# Patient Record
Sex: FEMALE | Race: WHITE | Employment: FULL TIME | URBAN - METROPOLITAN AREA
[De-identification: names, ages, dates, MRNs, and addresses within clinical notes are randomized per-mention and may not be internally consistent; named-entity substitution may affect disease eponyms.]

---

## 2017-07-25 ENCOUNTER — OFFICE VISIT (OUTPATIENT)
Dept: FAMILY MEDICINE CLINIC | Facility: CLINIC | Age: 42
End: 2017-07-25

## 2017-07-25 VITALS
BODY MASS INDEX: 21.83 KG/M2 | SYSTOLIC BLOOD PRESSURE: 100 MMHG | DIASTOLIC BLOOD PRESSURE: 63 MMHG | HEART RATE: 61 BPM | HEIGHT: 65 IN | WEIGHT: 131 LBS | TEMPERATURE: 98 F

## 2017-07-25 DIAGNOSIS — Z00.00 PHYSICAL EXAM, ROUTINE: Primary | ICD-10-CM

## 2017-07-25 PROCEDURE — 99396 PREV VISIT EST AGE 40-64: CPT | Performed by: FAMILY MEDICINE

## 2017-07-25 RX ORDER — SERTRALINE HYDROCHLORIDE 25 MG/1
25 TABLET, FILM COATED ORAL DAILY
COMMUNITY
End: 2017-07-25

## 2017-07-25 RX ORDER — SERTRALINE HYDROCHLORIDE 25 MG/1
25 TABLET, FILM COATED ORAL DAILY
Qty: 90 TABLET | Refills: 3 | Status: SHIPPED | OUTPATIENT
Start: 2017-07-25 | End: 2021-05-18

## 2017-07-26 NOTE — PROGRESS NOTES
HPI:    Patient ID: Gilberto Conner is a 39year old female.     Patient here to establish care,   Feels well has anxiety /depression controlled with zoloft        Review of Systems   Constitutional: Negative for activity change, appetite change, chills, di ASSESSMENT/PLAN:   Physical exam, routine  (primary encounter diagnosis)  Blood test to follow   Anxiety controlled cpm  Heartburn continue ppis prn    Orders Placed This Encounter      Lipid Panel [E]      Comp Metabolic Panel (14) [E]      CBC W Differ

## 2017-08-07 ENCOUNTER — HOSPITAL ENCOUNTER (OUTPATIENT)
Dept: MAMMOGRAPHY | Age: 42
Discharge: HOME | End: 2017-08-07
Attending: FAMILY MEDICINE
Payer: COMMERCIAL

## 2017-08-07 DIAGNOSIS — Z12.31 ENCOUNTER FOR SCREENING MAMMOGRAM FOR MALIGNANT NEOPLASM OF BREAST: ICD-10-CM

## 2017-08-07 DIAGNOSIS — Z00.00 PHYSICAL EXAM, ROUTINE: ICD-10-CM

## 2017-08-07 PROCEDURE — 77067 SCR MAMMO BI INCL CAD: CPT | Performed by: FAMILY MEDICINE

## 2017-09-29 ENCOUNTER — LAB ENCOUNTER (OUTPATIENT)
Dept: LAB | Age: 42
End: 2017-09-29
Attending: FAMILY MEDICINE
Payer: COMMERCIAL

## 2017-09-29 DIAGNOSIS — Z00.00 PHYSICAL EXAM, ROUTINE: ICD-10-CM

## 2017-09-29 PROCEDURE — 80053 COMPREHEN METABOLIC PANEL: CPT

## 2017-09-29 PROCEDURE — 85025 COMPLETE CBC W/AUTO DIFF WBC: CPT

## 2017-09-29 PROCEDURE — 36415 COLL VENOUS BLD VENIPUNCTURE: CPT

## 2017-09-29 PROCEDURE — 80061 LIPID PANEL: CPT

## 2017-10-01 ENCOUNTER — TELEPHONE (OUTPATIENT)
Dept: FAMILY MEDICINE CLINIC | Facility: CLINIC | Age: 42
End: 2017-10-01

## 2017-10-01 DIAGNOSIS — R79.9 ABNORMAL BLOOD FINDINGS: ICD-10-CM

## 2017-10-01 DIAGNOSIS — R00.2 PALPITATION: Primary | ICD-10-CM

## 2017-10-02 ENCOUNTER — LAB ENCOUNTER (OUTPATIENT)
Dept: LAB | Facility: HOSPITAL | Age: 42
End: 2017-10-02
Attending: FAMILY MEDICINE
Payer: COMMERCIAL

## 2017-10-02 DIAGNOSIS — R00.2 PALPITATION: Primary | ICD-10-CM

## 2017-10-02 PROCEDURE — 93010 ELECTROCARDIOGRAM REPORT: CPT | Performed by: FAMILY MEDICINE

## 2017-10-02 PROCEDURE — 93005 ELECTROCARDIOGRAM TRACING: CPT

## 2017-10-03 ENCOUNTER — APPOINTMENT (OUTPATIENT)
Dept: LAB | Age: 42
End: 2017-10-03
Attending: FAMILY MEDICINE
Payer: COMMERCIAL

## 2017-10-03 DIAGNOSIS — R79.9 ABNORMAL BLOOD FINDINGS: ICD-10-CM

## 2017-10-03 PROCEDURE — 82607 VITAMIN B-12: CPT

## 2017-10-03 PROCEDURE — 36415 COLL VENOUS BLD VENIPUNCTURE: CPT

## 2017-10-03 PROCEDURE — 80069 RENAL FUNCTION PANEL: CPT

## 2017-10-06 ENCOUNTER — IMMUNIZATION (OUTPATIENT)
Dept: PEDIATRICS CLINIC | Facility: CLINIC | Age: 42
End: 2017-10-06

## 2017-10-06 DIAGNOSIS — Z23 NEED FOR VACCINATION: ICD-10-CM

## 2017-10-06 PROCEDURE — 90686 IIV4 VACC NO PRSV 0.5 ML IM: CPT | Performed by: NURSE PRACTITIONER

## 2017-10-06 PROCEDURE — 90471 IMMUNIZATION ADMIN: CPT | Performed by: NURSE PRACTITIONER

## 2017-10-21 NOTE — PROGRESS NOTES
HPI:    Patient ID: Azul Marrero is a 39year old female. Patient was not feeling well few days ago now better,   Feels occasional palpitations.    Also few days ago had some nausea and diarrhea and blood test showed some decreased GFR, but now its f/u   Also if continues with palpitations to see cardiologist  No orders of the defined types were placed in this encounter.       Meds This Visit:  No prescriptions requested or ordered in this encounter       Imaging & Referrals:  None       [facility]#[id number]

## 2018-09-20 ENCOUNTER — OFFICE VISIT (OUTPATIENT)
Dept: FAMILY MEDICINE CLINIC | Facility: CLINIC | Age: 43
End: 2018-09-20
Payer: COMMERCIAL

## 2018-09-20 VITALS — TEMPERATURE: 99 F | SYSTOLIC BLOOD PRESSURE: 107 MMHG | HEART RATE: 61 BPM | DIASTOLIC BLOOD PRESSURE: 67 MMHG

## 2018-09-20 DIAGNOSIS — M53.3 CHRONIC RIGHT SI JOINT PAIN: Primary | ICD-10-CM

## 2018-09-20 DIAGNOSIS — G89.29 CHRONIC RIGHT SI JOINT PAIN: Primary | ICD-10-CM

## 2018-09-20 LAB
CONTROL LINE PRESENT WITH A CLEAR BACKGROUND (YES/NO): YES YES/NO
KIT LOT #: NORMAL NUMERIC

## 2018-09-20 PROCEDURE — 98927 OSTEOPATH MANJ 5-6 REGIONS: CPT | Performed by: FAMILY MEDICINE

## 2018-09-20 PROCEDURE — 81025 URINE PREGNANCY TEST: CPT | Performed by: FAMILY MEDICINE

## 2018-09-20 NOTE — PROGRESS NOTES
Chronic si joint pain / hip pain on rt side  Goes into rt hip and upper thigh laterally. Exercise improves temporarily. Exam  Ant anterior sacrum  Hip good rom nog pain with abduction /abduction  No knee pain.       A/p  (M53.3,  G89.29) Chronic right

## 2018-12-04 ENCOUNTER — IMMUNIZATION (OUTPATIENT)
Dept: PEDIATRICS CLINIC | Facility: CLINIC | Age: 43
End: 2018-12-04
Payer: COMMERCIAL

## 2018-12-04 DIAGNOSIS — Z23 NEED FOR VACCINATION: ICD-10-CM

## 2018-12-04 PROCEDURE — 90471 IMMUNIZATION ADMIN: CPT | Performed by: PEDIATRICS

## 2018-12-04 PROCEDURE — 90686 IIV4 VACC NO PRSV 0.5 ML IM: CPT | Performed by: PEDIATRICS

## 2019-05-04 NOTE — LETTER
[facility]   Date:   2/15/2024     Name:   Eliana Jean    YOB: 1975   MRN:   [id number]       WHERE IS YOUR PAIN NOW?  Ricci the areas on your body where you feel the described sensations.  Use the appropriate symbol.  Ricci the areas of radiation.  Include all affected areas.  Just to complete the picture, please draw in the face.     ACHE:  ^ ^ ^   NUMBNESS:  0000   PINS & NEEDLES:  = = = =                              ^ ^ ^                       0000              = = = =                                    ^ ^ ^                       0000            = = = =      BURNING:  XXXX   STABBING: ////                  XXXX                ////                         XXXX          ////     Please ricci the line below indicating your degree of pain right now  with 0 being no pain 10 being the worst pain possible.                                         0             1             2              3             4              5              6              7             8             9             10         Patient Signature:             No

## 2019-12-05 ENCOUNTER — OFFICE VISIT (OUTPATIENT)
Dept: INTERNAL MEDICINE CLINIC | Facility: CLINIC | Age: 44
End: 2019-12-05
Payer: COMMERCIAL

## 2019-12-05 DIAGNOSIS — R53.83 FATIGUE, UNSPECIFIED TYPE: ICD-10-CM

## 2019-12-05 DIAGNOSIS — Z00.00 ROUTINE HEALTH MAINTENANCE: ICD-10-CM

## 2019-12-05 DIAGNOSIS — R07.89 CHEST PRESSURE: Primary | ICD-10-CM

## 2019-12-05 NOTE — PROGRESS NOTES
HPI:    Patient ID: Karl Campbell is a 37year old female. Patient has a h/o of PVCs,  and intermittent substernal chest pressure and shortness of breath.   She has had symptoms like this in the past but they are recently worse and they are occurring normal.   Cardiovascular: Normal rate, regular rhythm and normal heart sounds. Pulmonary/Chest: Effort normal and breath sounds normal. No respiratory distress. Neurological: She is alert and oriented to person, place, and time.               ASSESSMEN

## 2019-12-06 NOTE — ASSESSMENT & PLAN NOTE
Patient's EKG shows NSR and is essentially normal.  I compared to previous EKG on 10/2/2017 and there is no change. There are no PVCs today. I recommend that she have a treadmill stress test and a chest x-ray.   She should follow-up with myself or her PCP

## 2019-12-06 NOTE — ASSESSMENT & PLAN NOTE
The patient is overdue for her routine mammogram and Pap smear. I encouraged her to schedule these things at her convenience.

## 2019-12-12 ENCOUNTER — LAB ENCOUNTER (OUTPATIENT)
Dept: LAB | Age: 44
End: 2019-12-12
Attending: INTERNAL MEDICINE
Payer: COMMERCIAL

## 2019-12-12 ENCOUNTER — IMMUNIZATION (OUTPATIENT)
Dept: PEDIATRICS CLINIC | Facility: CLINIC | Age: 44
End: 2019-12-12
Payer: COMMERCIAL

## 2019-12-12 DIAGNOSIS — Z23 NEED FOR VACCINATION: ICD-10-CM

## 2019-12-12 DIAGNOSIS — Z00.00 ROUTINE HEALTH MAINTENANCE: ICD-10-CM

## 2019-12-12 PROCEDURE — 84443 ASSAY THYROID STIM HORMONE: CPT

## 2019-12-12 PROCEDURE — 36415 COLL VENOUS BLD VENIPUNCTURE: CPT

## 2019-12-12 PROCEDURE — 85025 COMPLETE CBC W/AUTO DIFF WBC: CPT

## 2019-12-12 PROCEDURE — 90471 IMMUNIZATION ADMIN: CPT | Performed by: PEDIATRICS

## 2019-12-12 PROCEDURE — 80053 COMPREHEN METABOLIC PANEL: CPT

## 2019-12-12 PROCEDURE — 90686 IIV4 VACC NO PRSV 0.5 ML IM: CPT | Performed by: PEDIATRICS

## 2019-12-12 PROCEDURE — 82607 VITAMIN B-12: CPT

## 2020-06-29 DIAGNOSIS — Z78.9 PARTICIPANT IN HEALTH AND WELLNESS PLAN: Primary | ICD-10-CM

## 2020-10-22 ENCOUNTER — TELEPHONE (OUTPATIENT)
Dept: INTERNAL MEDICINE CLINIC | Facility: HOSPITAL | Age: 45
End: 2020-10-22

## 2020-10-22 ENCOUNTER — LAB ENCOUNTER (OUTPATIENT)
Dept: LAB | Age: 45
End: 2020-10-22
Attending: PREVENTIVE MEDICINE
Payer: COMMERCIAL

## 2020-10-22 DIAGNOSIS — Z20.822 SUSPECTED 2019 NOVEL CORONAVIRUS INFECTION: Primary | ICD-10-CM

## 2020-10-22 DIAGNOSIS — Z20.822 SUSPECTED 2019 NOVEL CORONAVIRUS INFECTION: ICD-10-CM

## 2020-12-17 ENCOUNTER — IMMUNIZATION (OUTPATIENT)
Dept: LAB | Facility: HOSPITAL | Age: 45
End: 2020-12-17
Attending: PREVENTIVE MEDICINE
Payer: COMMERCIAL

## 2020-12-17 DIAGNOSIS — Z23 NEED FOR VACCINATION: ICD-10-CM

## 2020-12-17 PROCEDURE — 0001A PFIZER-BIONTECH COVID-19 VACCINE: CPT

## 2021-01-07 ENCOUNTER — IMMUNIZATION (OUTPATIENT)
Dept: LAB | Facility: HOSPITAL | Age: 46
End: 2021-01-07
Attending: PREVENTIVE MEDICINE
Payer: COMMERCIAL

## 2021-01-07 DIAGNOSIS — Z23 NEED FOR VACCINATION: ICD-10-CM

## 2021-01-07 PROCEDURE — 0002A SARSCOV2 VAC 30MCG/0.3ML IM: CPT

## 2021-04-29 ENCOUNTER — HOSPITAL ENCOUNTER (OUTPATIENT)
Dept: MAMMOGRAPHY | Age: 46
Discharge: HOME | End: 2021-04-29
Attending: OBSTETRICS & GYNECOLOGY
Payer: COMMERCIAL

## 2021-04-29 DIAGNOSIS — Z12.31 ENCOUNTER FOR SCREENING MAMMOGRAM FOR MALIGNANT NEOPLASM OF BREAST: ICD-10-CM

## 2021-04-29 PROCEDURE — 77067 SCR MAMMO BI INCL CAD: CPT | Performed by: OBSTETRICS & GYNECOLOGY

## 2021-04-29 PROCEDURE — 77063 BREAST TOMOSYNTHESIS BI: CPT | Performed by: OBSTETRICS & GYNECOLOGY

## 2021-05-07 ENCOUNTER — TELEPHONE (OUTPATIENT)
Dept: OPHTHALMOLOGY | Facility: CLINIC | Age: 46
End: 2021-05-07

## 2021-05-07 NOTE — TELEPHONE ENCOUNTER
Patient calling for herself. She noticed a brown, stringy floater in her left eye suddenly yesterday while she was exercising. She denies any flashes of light, decreased vision or curtain/ veil effect.   She says the brown thing in her left eye looks like

## 2021-05-08 ENCOUNTER — OFFICE VISIT (OUTPATIENT)
Dept: OPHTHALMOLOGY | Facility: CLINIC | Age: 46
End: 2021-05-08
Payer: COMMERCIAL

## 2021-05-08 DIAGNOSIS — H47.392 OPTIC DISC HEMORRHAGE, LEFT: Primary | ICD-10-CM

## 2021-05-08 NOTE — ASSESSMENT & PLAN NOTE
Discussed diagnosis with patient. There is no retinal tear, but a hemorrhage on the left optic nerve. Discussed that it most likely was caused by exercise such as lifting weights. Discussed that most likely this will reabsorb in 1 month.    Patient was

## 2021-05-08 NOTE — PROGRESS NOTES
Chava Parson is a 39year old female. HPI:     HPI     New patient here for an urgent visit. She noticed a brown, stringy floater and black dots in her left eye suddenly on 5/6/21 while she was exercising.   She denies any flashes of light, decreas 8:05 AM)       Right Left    Pressure 18 19          Pupils       Pupils    Right PERRL    Left PERRL          Dilation     Left eye: 1.0% Mydriacyl @ 8:03 AM            Additional Tests     Amsler       Right Left     Normal Normal            Slit Lamp an by: Gideon Vang MD

## 2021-05-08 NOTE — PATIENT INSTRUCTIONS
Optic disc hemorrhage, left  Discussed diagnosis with patient. There is no retinal tear, but a hemorrhage on the left optic nerve. Discussed that it most likely was caused by exercise such as lifting weights.   Discussed that most likely this will reabs

## 2021-05-12 ENCOUNTER — MOBILE ENCOUNTER (OUTPATIENT)
Dept: FAMILY MEDICINE CLINIC | Facility: CLINIC | Age: 46
End: 2021-05-12

## 2021-05-12 DIAGNOSIS — R51.9 SUDDEN ONSET OF SEVERE HEADACHE: Primary | ICD-10-CM

## 2021-05-12 DIAGNOSIS — H47.029: ICD-10-CM

## 2021-05-13 ENCOUNTER — HOSPITAL ENCOUNTER (OUTPATIENT)
Dept: MRI IMAGING | Facility: HOSPITAL | Age: 46
Discharge: HOME | End: 2021-05-13
Attending: FAMILY MEDICINE
Payer: COMMERCIAL

## 2021-05-13 DIAGNOSIS — H47.029: ICD-10-CM

## 2021-05-13 DIAGNOSIS — R51.9 SUDDEN ONSET OF SEVERE HEADACHE: ICD-10-CM

## 2021-05-13 PROCEDURE — 70543 MRI ORBT/FAC/NCK W/O &W/DYE: CPT | Performed by: FAMILY MEDICINE

## 2021-05-13 PROCEDURE — A9575 INJ GADOTERATE MEGLUMI 0.1ML: HCPCS | Performed by: FAMILY MEDICINE

## 2021-05-13 PROCEDURE — 70553 MRI BRAIN STEM W/O & W/DYE: CPT | Performed by: FAMILY MEDICINE

## 2021-05-18 ENCOUNTER — OFFICE VISIT (OUTPATIENT)
Dept: FAMILY MEDICINE CLINIC | Facility: CLINIC | Age: 46
End: 2021-05-18
Payer: COMMERCIAL

## 2021-05-18 VITALS
BODY MASS INDEX: 23.05 KG/M2 | DIASTOLIC BLOOD PRESSURE: 67 MMHG | HEART RATE: 78 BPM | SYSTOLIC BLOOD PRESSURE: 105 MMHG | HEIGHT: 64 IN | WEIGHT: 135 LBS

## 2021-05-18 DIAGNOSIS — R51.9 NONINTRACTABLE EPISODIC HEADACHE, UNSPECIFIED HEADACHE TYPE: Primary | ICD-10-CM

## 2021-05-18 PROCEDURE — 97810 ACUP 1/> WO ESTIM 1ST 15 MIN: CPT | Performed by: FAMILY MEDICINE

## 2021-05-18 PROCEDURE — 3074F SYST BP LT 130 MM HG: CPT | Performed by: FAMILY MEDICINE

## 2021-05-18 PROCEDURE — 3008F BODY MASS INDEX DOCD: CPT | Performed by: FAMILY MEDICINE

## 2021-05-18 PROCEDURE — 99213 OFFICE O/P EST LOW 20 MIN: CPT | Performed by: FAMILY MEDICINE

## 2021-05-18 PROCEDURE — 3078F DIAST BP <80 MM HG: CPT | Performed by: FAMILY MEDICINE

## 2021-05-18 PROCEDURE — 97811 ACUP 1/> W/O ESTIM EA ADD 15: CPT | Performed by: FAMILY MEDICINE

## 2021-05-26 NOTE — PROCEDURES
Patient tolerated procedure well in excess of 30 minutes was spent with patient   There was no complications all needles were removed.    Patient was advised to rest today and f/u in 1-2 weeks  Liv3, raymundo treatment, boone points, ear points

## 2021-05-26 NOTE — PROGRESS NOTES
HPI/Subjective:   Patient ID: Priyanka Herr is a 39year old female. Patient here for f/u. Patient had carri change in vision and was dg with optic nerve hemorrhage . Had MRI brain and orbits normal. Continues with the pain in the eye .  It comes and g Prescriptions      No prescriptions requested or ordered in this encounter       Imaging & Referrals:  None

## 2021-06-08 ENCOUNTER — TELEPHONE (OUTPATIENT)
Dept: OPHTHALMOLOGY | Facility: CLINIC | Age: 46
End: 2021-06-08

## 2021-06-08 NOTE — TELEPHONE ENCOUNTER
Spoke with patient to find out why she cancelled her appointment with Dr. Ashly Harris. Patient says ended up seeing a retinal specialist- Dr. Escalante. He wanted pt to get an MRI of the brain and orbits right away which she had done.   The MRIs were normal.

## 2021-06-08 NOTE — TELEPHONE ENCOUNTER
Dr. Rojas Repress office states hasn't closed notes yet so they can't send but did send report but once he closes notes they will send please advise

## 2021-07-27 ENCOUNTER — HOSPITAL ENCOUNTER (OUTPATIENT)
Dept: MRI IMAGING | Age: 46
Discharge: HOME | End: 2021-07-27
Attending: OPHTHALMOLOGY
Payer: COMMERCIAL

## 2021-07-27 DIAGNOSIS — H47.022: ICD-10-CM

## 2021-07-27 DIAGNOSIS — H47.10 EDEMA OF OPTIC NERVE: ICD-10-CM

## 2021-07-27 DIAGNOSIS — R20.0 LEFT FACIAL NUMBNESS: ICD-10-CM

## 2021-07-27 DIAGNOSIS — G50.0 TRIGEMINAL NEURALGIA: ICD-10-CM

## 2021-07-27 PROCEDURE — 70544 MR ANGIOGRAPHY HEAD W/O DYE: CPT | Performed by: OPHTHALMOLOGY

## 2021-11-30 ENCOUNTER — IMMUNIZATION (OUTPATIENT)
Dept: LAB | Facility: HOSPITAL | Age: 46
End: 2021-11-30
Attending: EMERGENCY MEDICINE
Payer: COMMERCIAL

## 2021-11-30 DIAGNOSIS — Z23 NEED FOR VACCINATION: Primary | ICD-10-CM

## 2021-11-30 PROCEDURE — 0004A SARSCOV2 VAC 30MCG/0.3ML IM: CPT

## 2021-12-27 ENCOUNTER — HOSPITAL ENCOUNTER (OUTPATIENT)
Age: 46
Discharge: HOME | End: 2021-12-27
Payer: COMMERCIAL

## 2021-12-27 VITALS
DIASTOLIC BLOOD PRESSURE: 70 MMHG | RESPIRATION RATE: 18 BRPM | TEMPERATURE: 98 F | OXYGEN SATURATION: 99 % | HEART RATE: 62 BPM | SYSTOLIC BLOOD PRESSURE: 109 MMHG

## 2021-12-27 DIAGNOSIS — Z20.822 EXPOSURE TO COVID-19 VIRUS: ICD-10-CM

## 2021-12-27 DIAGNOSIS — Z20.822 ENCOUNTER FOR LABORATORY TESTING FOR COVID-19 VIRUS: Primary | ICD-10-CM

## 2021-12-27 LAB — SARS-COV-2 RNA RESP QL NAA+PROBE: NOT DETECTED

## 2021-12-27 PROCEDURE — 99213 OFFICE O/P EST LOW 20 MIN: CPT

## 2021-12-27 PROCEDURE — 99212 OFFICE O/P EST SF 10 MIN: CPT

## 2021-12-27 PROCEDURE — 99203 OFFICE O/P NEW LOW 30 MIN: CPT

## 2021-12-28 NOTE — ED PROVIDER NOTES
Patient presents with:  Covid-19 Test      HPI:     Tracey Wells is a 55year old female who presents for fatigue, mild headache, and nausea that started a few days ago. She has had multiple exposures to Covid and would like testing.   She is fully va Positive for stated complaint: chills, nausea, fatigue, headache  All other systems reviewed and negative except as noted above. Constitutional and Vital Signs Reviewed.     Physical Exam:     Findings:    /70   Pulse 62   Temp 97.8 °F (36.6 °C) (T

## 2022-03-04 ENCOUNTER — HOSPITAL ENCOUNTER (OUTPATIENT)
Dept: MAMMOGRAPHY | Facility: HOSPITAL | Age: 47
Discharge: HOME | End: 2022-03-04
Attending: OBSTETRICS & GYNECOLOGY
Payer: COMMERCIAL

## 2022-03-04 ENCOUNTER — HOSPITAL ENCOUNTER (OUTPATIENT)
Dept: ULTRASOUND IMAGING | Facility: HOSPITAL | Age: 47
Discharge: HOME | End: 2022-03-04
Attending: OBSTETRICS & GYNECOLOGY
Payer: COMMERCIAL

## 2022-03-04 DIAGNOSIS — N64.4 SORE NIPPLE: ICD-10-CM

## 2022-03-04 PROCEDURE — 77062 BREAST TOMOSYNTHESIS BI: CPT | Performed by: OBSTETRICS & GYNECOLOGY

## 2022-03-04 PROCEDURE — 76642 ULTRASOUND BREAST LIMITED: CPT | Performed by: OBSTETRICS & GYNECOLOGY

## 2022-03-04 PROCEDURE — 77066 DX MAMMO INCL CAD BI: CPT | Performed by: OBSTETRICS & GYNECOLOGY

## 2022-03-11 ENCOUNTER — LAB ENCOUNTER (OUTPATIENT)
Dept: LAB | Age: 47
End: 2022-03-11
Attending: STUDENT IN AN ORGANIZED HEALTH CARE EDUCATION/TRAINING PROGRAM
Payer: COMMERCIAL

## 2022-03-11 ENCOUNTER — TELEPHONE (OUTPATIENT)
Dept: FAMILY MEDICINE CLINIC | Facility: CLINIC | Age: 47
End: 2022-03-11

## 2022-03-11 ENCOUNTER — MED REC SCAN ONLY (OUTPATIENT)
Dept: FAMILY MEDICINE CLINIC | Facility: CLINIC | Age: 47
End: 2022-03-11

## 2022-03-11 ENCOUNTER — OFFICE VISIT (OUTPATIENT)
Dept: FAMILY MEDICINE CLINIC | Facility: CLINIC | Age: 47
End: 2022-03-11
Payer: COMMERCIAL

## 2022-03-11 VITALS
SYSTOLIC BLOOD PRESSURE: 95 MMHG | HEART RATE: 64 BPM | WEIGHT: 138 LBS | DIASTOLIC BLOOD PRESSURE: 59 MMHG | BODY MASS INDEX: 23.56 KG/M2 | HEIGHT: 64 IN

## 2022-03-11 DIAGNOSIS — Z12.11 COLON CANCER SCREENING: ICD-10-CM

## 2022-03-11 DIAGNOSIS — H47.029: ICD-10-CM

## 2022-03-11 DIAGNOSIS — G50.1 ATYPICAL FACIAL PAIN: Primary | ICD-10-CM

## 2022-03-11 DIAGNOSIS — Z00.00 WELL ADULT EXAM: ICD-10-CM

## 2022-03-11 LAB
ALBUMIN SERPL-MCNC: 3.3 G/DL (ref 3.4–5)
ALBUMIN/GLOB SERPL: 1.2 {RATIO} (ref 1–2)
ALP LIVER SERPL-CCNC: 49 U/L
ALT SERPL-CCNC: 27 U/L
ANION GAP SERPL CALC-SCNC: 4 MMOL/L (ref 0–18)
AST SERPL-CCNC: 34 U/L (ref 15–37)
BILIRUB SERPL-MCNC: 0.5 MG/DL (ref 0.1–2)
BUN BLD-MCNC: 15 MG/DL (ref 7–18)
BUN/CREAT SERPL: 15.5 (ref 10–20)
CALCIUM BLD-MCNC: 8.5 MG/DL (ref 8.5–10.1)
CHLORIDE SERPL-SCNC: 107 MMOL/L (ref 98–112)
CHOLEST SERPL-MCNC: 225 MG/DL (ref ?–200)
CO2 SERPL-SCNC: 29 MMOL/L (ref 21–32)
CREAT BLD-MCNC: 0.97 MG/DL
DEPRECATED RDW RBC AUTO: 48 FL (ref 35.1–46.3)
ERYTHROCYTE [DISTWIDTH] IN BLOOD BY AUTOMATED COUNT: 12.6 % (ref 11–15)
EST. AVERAGE GLUCOSE BLD GHB EST-MCNC: 100 MG/DL (ref 68–126)
FASTING PATIENT LIPID ANSWER: YES
FASTING STATUS PATIENT QL REPORTED: YES
GLOBULIN PLAS-MCNC: 2.7 G/DL (ref 2.8–4.4)
GLUCOSE BLD-MCNC: 86 MG/DL (ref 70–99)
HBA1C MFR BLD: 5.1 % (ref ?–5.7)
HCT VFR BLD AUTO: 42.4 %
HDLC SERPL-MCNC: 86 MG/DL (ref 40–59)
HGB BLD-MCNC: 13.8 G/DL
LDLC SERPL CALC-MCNC: 129 MG/DL (ref ?–100)
MCH RBC QN AUTO: 33.6 PG (ref 26–34)
MCHC RBC AUTO-ENTMCNC: 32.5 G/DL (ref 31–37)
MCV RBC AUTO: 103.2 FL
NONHDLC SERPL-MCNC: 139 MG/DL (ref ?–130)
OSMOLALITY SERPL CALC.SUM OF ELEC: 290 MOSM/KG (ref 275–295)
PLATELET # BLD AUTO: 134 10(3)UL (ref 150–450)
POTASSIUM SERPL-SCNC: 4.2 MMOL/L (ref 3.5–5.1)
PROT SERPL-MCNC: 6 G/DL (ref 6.4–8.2)
RBC # BLD AUTO: 4.11 X10(6)UL
SODIUM SERPL-SCNC: 140 MMOL/L (ref 136–145)
TRIGL SERPL-MCNC: 59 MG/DL (ref 30–149)
TSI SER-ACNC: 1.32 MIU/ML (ref 0.36–3.74)
VIT D+METAB SERPL-MCNC: 12.3 NG/ML (ref 30–100)
VLDLC SERPL CALC-MCNC: 11 MG/DL (ref 0–30)
WBC # BLD AUTO: 5.4 X10(3) UL (ref 4–11)

## 2022-03-11 PROCEDURE — 82306 VITAMIN D 25 HYDROXY: CPT

## 2022-03-11 PROCEDURE — 36415 COLL VENOUS BLD VENIPUNCTURE: CPT

## 2022-03-11 PROCEDURE — 80061 LIPID PANEL: CPT

## 2022-03-11 PROCEDURE — 80053 COMPREHEN METABOLIC PANEL: CPT

## 2022-03-11 PROCEDURE — 99214 OFFICE O/P EST MOD 30 MIN: CPT | Performed by: STUDENT IN AN ORGANIZED HEALTH CARE EDUCATION/TRAINING PROGRAM

## 2022-03-11 PROCEDURE — 3074F SYST BP LT 130 MM HG: CPT | Performed by: STUDENT IN AN ORGANIZED HEALTH CARE EDUCATION/TRAINING PROGRAM

## 2022-03-11 PROCEDURE — 3078F DIAST BP <80 MM HG: CPT | Performed by: STUDENT IN AN ORGANIZED HEALTH CARE EDUCATION/TRAINING PROGRAM

## 2022-03-11 PROCEDURE — 3008F BODY MASS INDEX DOCD: CPT | Performed by: STUDENT IN AN ORGANIZED HEALTH CARE EDUCATION/TRAINING PROGRAM

## 2022-03-11 PROCEDURE — 84443 ASSAY THYROID STIM HORMONE: CPT

## 2022-03-11 PROCEDURE — 85027 COMPLETE CBC AUTOMATED: CPT

## 2022-03-11 PROCEDURE — 83036 HEMOGLOBIN GLYCOSYLATED A1C: CPT

## 2022-03-11 RX ORDER — CLONAZEPAM 0.5 MG/1
TABLET ORAL
COMMUNITY

## 2022-03-11 RX ORDER — PYRIDOXINE HCL (VITAMIN B6) 50 MG
TABLET ORAL
COMMUNITY

## 2022-03-11 RX ORDER — DROSPIRENONE 4 MG/1
1 TABLET, FILM COATED ORAL DAILY
COMMUNITY
Start: 2022-02-08

## 2022-03-11 RX ORDER — INDOMETHACIN 50 MG/1
50 CAPSULE ORAL
Qty: 30 CAPSULE | Refills: 0 | Status: SHIPPED | OUTPATIENT
Start: 2022-03-11

## 2022-03-11 RX ORDER — SERTRALINE HYDROCHLORIDE 100 MG/1
100 TABLET, FILM COATED ORAL DAILY
COMMUNITY
Start: 2022-03-02

## 2022-03-14 ENCOUNTER — MED REC SCAN ONLY (OUTPATIENT)
Dept: FAMILY MEDICINE CLINIC | Facility: CLINIC | Age: 47
End: 2022-03-14

## 2022-03-23 ENCOUNTER — MED REC SCAN ONLY (OUTPATIENT)
Dept: FAMILY MEDICINE CLINIC | Facility: CLINIC | Age: 47
End: 2022-03-23

## 2022-04-08 ENCOUNTER — TELEPHONE (OUTPATIENT)
Dept: FAMILY MEDICINE CLINIC | Facility: CLINIC | Age: 47
End: 2022-04-08

## 2022-04-08 ENCOUNTER — MED REC SCAN ONLY (OUTPATIENT)
Dept: FAMILY MEDICINE CLINIC | Facility: CLINIC | Age: 47
End: 2022-04-08

## 2022-05-17 ENCOUNTER — OFFICE VISIT (OUTPATIENT)
Dept: NEUROLOGY | Facility: CLINIC | Age: 47
End: 2022-05-17
Payer: COMMERCIAL

## 2022-05-17 VITALS — SYSTOLIC BLOOD PRESSURE: 106 MMHG | DIASTOLIC BLOOD PRESSURE: 64 MMHG | HEART RATE: 70 BPM

## 2022-05-17 DIAGNOSIS — M54.81 CERVICO-OCCIPITAL NEURALGIA: Primary | ICD-10-CM

## 2022-05-17 DIAGNOSIS — R51.9 LEFT-SIDED HEADACHE: ICD-10-CM

## 2022-05-17 PROCEDURE — 99204 OFFICE O/P NEW MOD 45 MIN: CPT | Performed by: OTHER

## 2022-05-17 PROCEDURE — 3074F SYST BP LT 130 MM HG: CPT | Performed by: OTHER

## 2022-05-17 PROCEDURE — 3078F DIAST BP <80 MM HG: CPT | Performed by: OTHER

## 2022-05-17 RX ORDER — METHYLPREDNISOLONE 4 MG/1
TABLET ORAL
Qty: 1 EACH | Refills: 0 | Status: SHIPPED | OUTPATIENT
Start: 2022-05-17

## 2022-05-24 ENCOUNTER — NURSE ONLY (OUTPATIENT)
Dept: INTERNAL MEDICINE CLINIC | Facility: HOSPITAL | Age: 47
End: 2022-05-24
Attending: EMERGENCY MEDICINE

## 2022-05-24 DIAGNOSIS — Z00.00 WELLNESS EXAMINATION: Primary | ICD-10-CM

## 2022-05-24 LAB
RUBV IGG SER QL: POSITIVE
RUBV IGG SER-ACNC: 193.2 IU/ML (ref 10–?)

## 2022-05-24 PROCEDURE — 86480 TB TEST CELL IMMUN MEASURE: CPT

## 2022-05-24 PROCEDURE — 86762 RUBELLA ANTIBODY: CPT

## 2022-05-24 PROCEDURE — 86765 RUBEOLA ANTIBODY: CPT

## 2022-05-24 PROCEDURE — 86735 MUMPS ANTIBODY: CPT

## 2022-05-24 PROCEDURE — 86787 VARICELLA-ZOSTER ANTIBODY: CPT

## 2022-05-25 LAB
M TB IFN-G CD4+ T-CELLS BLD-ACNC: 0.01 IU/ML
M TB TUBERC IFN-G BLD QL: NEGATIVE
M TB TUBERC IGNF/MITOGEN IGNF CONTROL: >10 IU/ML
MEV IGG SER-ACNC: 13.1 AU/ML (ref 16.5–?)
MUV IGG SER IA-ACNC: 64.3 AU/ML (ref 11–?)
QFT TB1 AG MINUS NIL: -0.01 IU/ML
QFT TB2 AG MINUS NIL: 0 IU/ML
VZV IGG SER IA-ACNC: 2082 (ref 165–?)

## 2022-06-29 ENCOUNTER — PATIENT MESSAGE (OUTPATIENT)
Dept: NEUROLOGY | Facility: CLINIC | Age: 47
End: 2022-06-29

## 2022-06-29 NOTE — TELEPHONE ENCOUNTER
From: Kim Bolaños  To: Maryellen Saucedo MD  Sent: 6/29/2022 11:03 AM CDT  Subject: Mri     Hi I never received call if this as approved by my insurance? Should I schedule?     Thanks,  Dr. Aníbal Grady

## 2022-07-21 ENCOUNTER — HOSPITAL ENCOUNTER (OUTPATIENT)
Dept: MRI IMAGING | Age: 47
Discharge: HOME | End: 2022-07-21
Attending: Other
Payer: COMMERCIAL

## 2022-07-21 DIAGNOSIS — M54.81 CERVICO-OCCIPITAL NEURALGIA: ICD-10-CM

## 2022-07-21 PROCEDURE — 72141 MRI NECK SPINE W/O DYE: CPT | Performed by: OTHER

## 2022-09-13 ENCOUNTER — OFFICE VISIT (OUTPATIENT)
Dept: PHYSICAL MEDICINE AND REHAB | Facility: CLINIC | Age: 47
End: 2022-09-13
Payer: COMMERCIAL

## 2022-09-13 ENCOUNTER — TELEPHONE (OUTPATIENT)
Dept: PHYSICAL THERAPY | Facility: HOSPITAL | Age: 47
End: 2022-09-13

## 2022-09-13 VITALS — HEIGHT: 64 IN | BODY MASS INDEX: 23.56 KG/M2 | WEIGHT: 138 LBS

## 2022-09-13 DIAGNOSIS — M54.12 CERVICAL RADICULOPATHY: Primary | ICD-10-CM

## 2022-09-13 PROCEDURE — 99204 OFFICE O/P NEW MOD 45 MIN: CPT | Performed by: PHYSICAL MEDICINE & REHABILITATION

## 2022-09-13 PROCEDURE — 3008F BODY MASS INDEX DOCD: CPT | Performed by: PHYSICAL MEDICINE & REHABILITATION

## 2022-09-13 RX ORDER — METAXALONE 800 MG/1
800 TABLET ORAL 3 TIMES DAILY
COMMUNITY
Start: 2022-09-07

## 2022-09-13 RX ORDER — MELOXICAM 15 MG/1
15 TABLET ORAL DAILY
Qty: 30 TABLET | Refills: 0 | Status: SHIPPED | OUTPATIENT
Start: 2022-09-13

## 2022-09-13 RX ORDER — PREDNISONE 10 MG/1
TABLET ORAL
Qty: 28 TABLET | Refills: 0 | Status: SHIPPED | OUTPATIENT
Start: 2022-09-13

## 2022-09-13 NOTE — PATIENT INSTRUCTIONS
1. Consider doxepin 10mg at bedtime for the future  2. Take all the prednisone then follow with Mobic  3.  [facility] will call you to arrange an appointment

## 2022-09-15 ENCOUNTER — TELEPHONE (OUTPATIENT)
Dept: NEUROLOGY | Facility: CLINIC | Age: 47
End: 2022-09-15

## 2022-09-15 ENCOUNTER — TELEPHONE (OUTPATIENT)
Dept: PHYSICAL THERAPY | Facility: HOSPITAL | Age: 47
End: 2022-09-15

## 2022-09-15 NOTE — TELEPHONE ENCOUNTER
Physical Therapy Referral # [id number]    Called [employer] spoke to Blayneparag Atkinson who states request for PT under tracking # [id number] denied due to patient not having [employer] coverage.     Please contact patient to  verify and update patient coverage       Notify front office staff

## 2022-09-16 ENCOUNTER — OFFICE VISIT (OUTPATIENT)
Dept: PHYSICAL THERAPY | Facility: HOSPITAL | Age: 47
End: 2022-09-16
Attending: PHYSICAL MEDICINE & REHABILITATION
Payer: COMMERCIAL

## 2022-09-16 DIAGNOSIS — M54.12 CERVICAL RADICULOPATHY: ICD-10-CM

## 2022-09-16 PROCEDURE — 97161 PT EVAL LOW COMPLEX 20 MIN: CPT | Performed by: PHYSICAL THERAPIST

## 2022-09-16 PROCEDURE — 97110 THERAPEUTIC EXERCISES: CPT | Performed by: PHYSICAL THERAPIST

## 2022-09-23 ENCOUNTER — OFFICE VISIT (OUTPATIENT)
Dept: PHYSICAL THERAPY | Facility: HOSPITAL | Age: 47
End: 2022-09-23
Attending: PHYSICAL MEDICINE & REHABILITATION
Payer: COMMERCIAL

## 2022-09-23 PROCEDURE — 97110 THERAPEUTIC EXERCISES: CPT | Performed by: PHYSICAL THERAPIST

## 2022-09-23 PROCEDURE — 97140 MANUAL THERAPY 1/> REGIONS: CPT | Performed by: PHYSICAL THERAPIST

## 2022-09-23 PROCEDURE — 97112 NEUROMUSCULAR REEDUCATION: CPT | Performed by: PHYSICAL THERAPIST

## 2022-09-27 ENCOUNTER — APPOINTMENT (OUTPATIENT)
Dept: PHYSICAL THERAPY | Facility: HOSPITAL | Age: 47
End: 2022-09-27
Attending: PHYSICAL MEDICINE & REHABILITATION
Payer: COMMERCIAL

## 2022-09-29 ENCOUNTER — TELEPHONE (OUTPATIENT)
Dept: PHYSICAL THERAPY | Facility: HOSPITAL | Age: 47
End: 2022-09-29

## 2022-10-04 ENCOUNTER — OFFICE VISIT (OUTPATIENT)
Dept: PHYSICAL THERAPY | Facility: HOSPITAL | Age: 47
End: 2022-10-04
Attending: PHYSICAL MEDICINE & REHABILITATION
Payer: COMMERCIAL

## 2022-10-04 PROCEDURE — 97140 MANUAL THERAPY 1/> REGIONS: CPT | Performed by: PHYSICAL THERAPIST

## 2022-10-07 ENCOUNTER — OFFICE VISIT (OUTPATIENT)
Dept: PHYSICAL THERAPY | Facility: HOSPITAL | Age: 47
End: 2022-10-07
Attending: PHYSICAL MEDICINE & REHABILITATION
Payer: COMMERCIAL

## 2022-10-07 PROCEDURE — 97140 MANUAL THERAPY 1/> REGIONS: CPT | Performed by: PHYSICAL THERAPIST

## 2022-10-14 ENCOUNTER — OFFICE VISIT (OUTPATIENT)
Dept: PHYSICAL THERAPY | Facility: HOSPITAL | Age: 47
End: 2022-10-14
Attending: PHYSICAL MEDICINE & REHABILITATION
Payer: COMMERCIAL

## 2022-10-14 PROCEDURE — 97112 NEUROMUSCULAR REEDUCATION: CPT | Performed by: PHYSICAL THERAPIST

## 2022-10-14 PROCEDURE — 97140 MANUAL THERAPY 1/> REGIONS: CPT | Performed by: PHYSICAL THERAPIST

## 2022-10-18 ENCOUNTER — APPOINTMENT (OUTPATIENT)
Dept: PHYSICAL THERAPY | Facility: HOSPITAL | Age: 47
End: 2022-10-18
Attending: PHYSICAL MEDICINE & REHABILITATION
Payer: COMMERCIAL

## 2022-10-21 ENCOUNTER — APPOINTMENT (OUTPATIENT)
Dept: PHYSICAL THERAPY | Facility: HOSPITAL | Age: 47
End: 2022-10-21
Attending: PHYSICAL MEDICINE & REHABILITATION
Payer: COMMERCIAL

## 2022-10-24 ENCOUNTER — IMMUNIZATION (OUTPATIENT)
Dept: LAB | Facility: HOSPITAL | Age: 47
End: 2022-10-24

## 2022-10-24 DIAGNOSIS — Z23 NEED FOR VACCINATION: Primary | ICD-10-CM

## 2022-10-24 PROCEDURE — 90471 IMMUNIZATION ADMIN: CPT

## 2022-10-25 ENCOUNTER — OFFICE VISIT (OUTPATIENT)
Dept: PHYSICAL THERAPY | Facility: HOSPITAL | Age: 47
End: 2022-10-25
Attending: PHYSICAL MEDICINE & REHABILITATION
Payer: COMMERCIAL

## 2022-10-25 PROCEDURE — 97140 MANUAL THERAPY 1/> REGIONS: CPT | Performed by: PHYSICAL THERAPIST

## 2022-11-01 ENCOUNTER — OFFICE VISIT (OUTPATIENT)
Dept: PHYSICAL THERAPY | Facility: HOSPITAL | Age: 47
End: 2022-11-01
Attending: PHYSICAL MEDICINE & REHABILITATION
Payer: COMMERCIAL

## 2022-11-01 PROCEDURE — 97140 MANUAL THERAPY 1/> REGIONS: CPT | Performed by: PHYSICAL THERAPIST

## 2022-11-01 PROCEDURE — 97112 NEUROMUSCULAR REEDUCATION: CPT | Performed by: PHYSICAL THERAPIST

## 2022-11-04 ENCOUNTER — APPOINTMENT (OUTPATIENT)
Dept: PHYSICAL THERAPY | Facility: HOSPITAL | Age: 47
End: 2022-11-04
Attending: PHYSICAL MEDICINE & REHABILITATION
Payer: COMMERCIAL

## 2022-11-15 ENCOUNTER — OFFICE VISIT (OUTPATIENT)
Dept: PHYSICAL THERAPY | Facility: HOSPITAL | Age: 47
End: 2022-11-15
Attending: PHYSICAL MEDICINE & REHABILITATION
Payer: COMMERCIAL

## 2022-11-15 PROCEDURE — 97140 MANUAL THERAPY 1/> REGIONS: CPT | Performed by: PHYSICAL THERAPIST

## 2022-11-18 ENCOUNTER — OFFICE VISIT (OUTPATIENT)
Dept: PHYSICAL THERAPY | Facility: HOSPITAL | Age: 47
End: 2022-11-18
Attending: PHYSICAL MEDICINE & REHABILITATION
Payer: COMMERCIAL

## 2022-11-18 PROCEDURE — 97112 NEUROMUSCULAR REEDUCATION: CPT | Performed by: PHYSICAL THERAPIST

## 2022-11-18 PROCEDURE — 97140 MANUAL THERAPY 1/> REGIONS: CPT | Performed by: PHYSICAL THERAPIST

## 2022-11-25 ENCOUNTER — APPOINTMENT (OUTPATIENT)
Dept: PHYSICAL THERAPY | Facility: HOSPITAL | Age: 47
End: 2022-11-25
Attending: PHYSICAL MEDICINE & REHABILITATION
Payer: COMMERCIAL

## 2022-11-29 ENCOUNTER — APPOINTMENT (OUTPATIENT)
Dept: PHYSICAL THERAPY | Facility: HOSPITAL | Age: 47
End: 2022-11-29
Attending: PHYSICAL MEDICINE & REHABILITATION
Payer: COMMERCIAL

## 2022-12-02 ENCOUNTER — OFFICE VISIT (OUTPATIENT)
Dept: PHYSICAL THERAPY | Facility: HOSPITAL | Age: 47
End: 2022-12-02
Attending: PHYSICAL MEDICINE & REHABILITATION
Payer: COMMERCIAL

## 2022-12-02 PROCEDURE — 97140 MANUAL THERAPY 1/> REGIONS: CPT | Performed by: PHYSICAL THERAPIST

## 2022-12-06 ENCOUNTER — APPOINTMENT (OUTPATIENT)
Dept: PHYSICAL THERAPY | Facility: HOSPITAL | Age: 47
End: 2022-12-06
Attending: PHYSICAL MEDICINE & REHABILITATION
Payer: COMMERCIAL

## 2022-12-13 ENCOUNTER — APPOINTMENT (OUTPATIENT)
Dept: PHYSICAL THERAPY | Facility: HOSPITAL | Age: 47
End: 2022-12-13
Attending: PHYSICAL MEDICINE & REHABILITATION
Payer: COMMERCIAL

## 2022-12-13 PROCEDURE — 97110 THERAPEUTIC EXERCISES: CPT | Performed by: PHYSICAL THERAPIST

## 2022-12-13 PROCEDURE — 97140 MANUAL THERAPY 1/> REGIONS: CPT | Performed by: PHYSICAL THERAPIST

## 2022-12-19 ENCOUNTER — TELEPHONE (OUTPATIENT)
Dept: PHYSICAL THERAPY | Facility: HOSPITAL | Age: 47
End: 2022-12-19

## 2022-12-20 ENCOUNTER — APPOINTMENT (OUTPATIENT)
Dept: PHYSICAL THERAPY | Facility: HOSPITAL | Age: 47
End: 2022-12-20
Attending: PHYSICAL MEDICINE & REHABILITATION
Payer: COMMERCIAL

## 2022-12-23 ENCOUNTER — APPOINTMENT (OUTPATIENT)
Dept: PHYSICAL THERAPY | Facility: HOSPITAL | Age: 47
End: 2022-12-23
Attending: PHYSICAL MEDICINE & REHABILITATION
Payer: COMMERCIAL

## 2023-01-12 RX ORDER — INDOMETHACIN 50 MG/1
50 CAPSULE ORAL
COMMUNITY
End: 2023-02-14 | Stop reason: CLARIF

## 2023-01-12 RX ORDER — METHYLPREDNISOLONE 4 MG/1
TABLET ORAL SEE ADMIN INSTRUCTIONS
COMMUNITY

## 2023-01-12 RX ORDER — DROSPIRENONE 4 MG/1
TABLET, FILM COATED ORAL
COMMUNITY

## 2023-01-12 RX ORDER — NORGESTIMATE AND ETHINYL ESTRADIOL 7DAYSX3 28
1 KIT ORAL DAILY
COMMUNITY

## 2023-01-12 RX ORDER — SERTRALINE HYDROCHLORIDE 100 MG/1
TABLET, FILM COATED ORAL
COMMUNITY

## 2023-01-12 RX ORDER — PREDNISONE 10 MG/1
TABLET ORAL
COMMUNITY

## 2023-01-12 RX ORDER — CLONAZEPAM 0.5 MG/1
0.5 TABLET ORAL AT BEDTIME
COMMUNITY

## 2023-01-12 RX ORDER — METAXALONE 800 MG/1
800 TABLET ORAL 3 TIMES DAILY
COMMUNITY

## 2023-01-12 RX ORDER — MELOXICAM 15 MG/1
15 TABLET ORAL DAILY
COMMUNITY

## 2023-01-12 RX ORDER — PREDNISONE 20 MG/1
20 TABLET ORAL 2 TIMES DAILY
COMMUNITY

## 2023-01-12 RX ORDER — CLONAZEPAM 1 MG/1
1 TABLET ORAL NIGHTLY
COMMUNITY

## 2023-01-19 ENCOUNTER — OFFICE VISIT (OUTPATIENT)
Dept: OPHTHALMOLOGY | Facility: CLINIC | Age: 48
End: 2023-01-19

## 2023-01-19 DIAGNOSIS — H47.392 OPTIC DISC HEMORRHAGE, LEFT: Primary | ICD-10-CM

## 2023-01-19 NOTE — ASSESSMENT & PLAN NOTE
Discussed diagnosis with patient. There is no retinal tear, but a hemorrhage on the left optic nerve.      Patient will follow up with Dr. Mini Larson., MD.

## 2023-01-19 NOTE — PATIENT INSTRUCTIONS
Optic disc hemorrhage, left  Discussed diagnosis with patient. There is no retinal tear, but a hemorrhage on the left optic nerve.      Patient will follow up with Dr. Angeles Vieira MD.

## 2023-02-07 ENCOUNTER — HOSPITAL ENCOUNTER (OUTPATIENT)
Dept: MRI IMAGING | Age: 48
Discharge: HOME | End: 2023-02-07
Payer: COMMERCIAL

## 2023-02-07 DIAGNOSIS — H47.10 EDEMA OF OPTIC NERVE: ICD-10-CM

## 2023-02-07 DIAGNOSIS — H47.022: ICD-10-CM

## 2023-02-07 DIAGNOSIS — R20.0 LEFT FACIAL NUMBNESS: ICD-10-CM

## 2023-02-07 DIAGNOSIS — H47.11 PAPILLEDEMA ASSOCIATED WITH INCREASED INTRACRANIAL PRESSURE: ICD-10-CM

## 2023-02-07 DIAGNOSIS — G50.0 TRIGEMINAL NEURALGIA: ICD-10-CM

## 2023-02-07 PROCEDURE — 70544 MR ANGIOGRAPHY HEAD W/O DYE: CPT | Performed by: OPHTHALMOLOGY

## 2023-02-09 VITALS — HEIGHT: 64 IN

## 2023-02-14 ENCOUNTER — OFFICE VISIT (OUTPATIENT)
Dept: OBGYN | Age: 48
End: 2023-02-14

## 2023-02-14 VITALS
DIASTOLIC BLOOD PRESSURE: 63 MMHG | BODY MASS INDEX: 23.22 KG/M2 | WEIGHT: 136 LBS | HEART RATE: 97 BPM | SYSTOLIC BLOOD PRESSURE: 99 MMHG | HEIGHT: 64 IN | TEMPERATURE: 97.9 F

## 2023-02-14 DIAGNOSIS — Z11.51 SCREENING FOR HPV (HUMAN PAPILLOMAVIRUS): ICD-10-CM

## 2023-02-14 DIAGNOSIS — Z12.31 ENCOUNTER FOR SCREENING MAMMOGRAM FOR MALIGNANT NEOPLASM OF BREAST: ICD-10-CM

## 2023-02-14 DIAGNOSIS — Z12.4 SCREENING FOR MALIGNANT NEOPLASM OF CERVIX: ICD-10-CM

## 2023-02-14 DIAGNOSIS — Z30.9 ENCOUNTER FOR CONTRACEPTIVE MANAGEMENT, UNSPECIFIED TYPE: ICD-10-CM

## 2023-02-14 DIAGNOSIS — Z01.419 ROUTINE GYNECOLOGICAL EXAMINATION: Primary | ICD-10-CM

## 2023-02-14 PROCEDURE — 88175 CYTOPATH C/V AUTO FLUID REDO: CPT | Performed by: INTERNAL MEDICINE

## 2023-02-14 PROCEDURE — 99396 PREV VISIT EST AGE 40-64: CPT | Performed by: OBSTETRICS & GYNECOLOGY

## 2023-02-14 PROCEDURE — 87624 HPV HI-RISK TYP POOLED RSLT: CPT | Performed by: INTERNAL MEDICINE

## 2023-02-14 RX ORDER — ACETAZOLAMIDE 500 MG/1
CAPSULE, EXTENDED RELEASE ORAL
COMMUNITY
Start: 2023-01-26

## 2023-02-14 ASSESSMENT — ENCOUNTER SYMPTOMS
GASTROINTESTINAL NEGATIVE: 1
ENDOCRINE NEGATIVE: 1
RESPIRATORY NEGATIVE: 1
ALLERGIC/IMMUNOLOGIC NEGATIVE: 1
EYES NEGATIVE: 1
PSYCHIATRIC NEGATIVE: 1
NEUROLOGICAL NEGATIVE: 1
HEMATOLOGIC/LYMPHATIC NEGATIVE: 1
CONSTITUTIONAL NEGATIVE: 1

## 2023-02-19 LAB
CASE RPRT: NORMAL
CLINICAL INFO: NORMAL
CYTOLOGY CVX/VAG STUDY: NORMAL
HPV16+18+45 E6+E7MRNA CVX NAA+PROBE: NEGATIVE
Lab: NORMAL
PAP EDUCATIONAL NOTE: NORMAL
SPECIMEN ADEQUACY: NORMAL

## 2023-02-21 ENCOUNTER — LAB ENCOUNTER (OUTPATIENT)
Dept: LAB | Age: 48
End: 2023-02-21
Attending: FAMILY MEDICINE
Payer: COMMERCIAL

## 2023-02-21 ENCOUNTER — OFFICE VISIT (OUTPATIENT)
Dept: FAMILY MEDICINE CLINIC | Facility: CLINIC | Age: 48
End: 2023-02-21

## 2023-02-21 VITALS
DIASTOLIC BLOOD PRESSURE: 60 MMHG | HEIGHT: 64 IN | SYSTOLIC BLOOD PRESSURE: 108 MMHG | WEIGHT: 138 LBS | HEART RATE: 58 BPM | OXYGEN SATURATION: 97 % | RESPIRATION RATE: 16 BRPM | BODY MASS INDEX: 23.56 KG/M2

## 2023-02-21 DIAGNOSIS — Z00.00 ANNUAL PHYSICAL EXAM: ICD-10-CM

## 2023-02-21 DIAGNOSIS — R19.7 DIARRHEA, UNSPECIFIED TYPE: Primary | ICD-10-CM

## 2023-02-21 DIAGNOSIS — R19.7 DIARRHEA, UNSPECIFIED TYPE: ICD-10-CM

## 2023-02-21 LAB
ALBUMIN SERPL-MCNC: 3.2 G/DL (ref 3.4–5)
ALBUMIN/GLOB SERPL: 1.1 {RATIO} (ref 1–2)
ALP LIVER SERPL-CCNC: 46 U/L
ALT SERPL-CCNC: 46 U/L
ANION GAP SERPL CALC-SCNC: 6 MMOL/L (ref 0–18)
AST SERPL-CCNC: 42 U/L (ref 15–37)
BASOPHILS # BLD AUTO: 0.05 X10(3) UL (ref 0–0.2)
BASOPHILS NFR BLD AUTO: 1 %
BILIRUB SERPL-MCNC: 0.3 MG/DL (ref 0.1–2)
BUN BLD-MCNC: 16 MG/DL (ref 7–18)
BUN/CREAT SERPL: 14.5 (ref 10–20)
CALCIUM BLD-MCNC: 8.5 MG/DL (ref 8.5–10.1)
CHLORIDE SERPL-SCNC: 115 MMOL/L (ref 98–112)
CHOLEST SERPL-MCNC: 203 MG/DL (ref ?–200)
CO2 SERPL-SCNC: 22 MMOL/L (ref 21–32)
CREAT BLD-MCNC: 1.1 MG/DL
DEPRECATED RDW RBC AUTO: 50.7 FL (ref 35.1–46.3)
EOSINOPHIL # BLD AUTO: 0.16 X10(3) UL (ref 0–0.7)
EOSINOPHIL NFR BLD AUTO: 3.1 %
ERYTHROCYTE [DISTWIDTH] IN BLOOD BY AUTOMATED COUNT: 13.7 % (ref 11–15)
FASTING PATIENT LIPID ANSWER: NO
FASTING STATUS PATIENT QL REPORTED: NO
GFR SERPLBLD BASED ON 1.73 SQ M-ARVRAT: 62 ML/MIN/1.73M2 (ref 60–?)
GLOBULIN PLAS-MCNC: 2.9 G/DL (ref 2.8–4.4)
GLUCOSE BLD-MCNC: 93 MG/DL (ref 70–99)
HCT VFR BLD AUTO: 41.1 %
HDLC SERPL-MCNC: 83 MG/DL (ref 40–59)
HGB BLD-MCNC: 13.7 G/DL
IGA SERPL-MCNC: 93.3 MG/DL (ref 70–312)
IMM GRANULOCYTES # BLD AUTO: 0.02 X10(3) UL (ref 0–1)
IMM GRANULOCYTES NFR BLD: 0.4 %
LDLC SERPL CALC-MCNC: 105 MG/DL (ref ?–100)
LYMPHOCYTES # BLD AUTO: 1.5 X10(3) UL (ref 1–4)
LYMPHOCYTES NFR BLD AUTO: 29.5 %
MCH RBC QN AUTO: 33.3 PG (ref 26–34)
MCHC RBC AUTO-ENTMCNC: 33.3 G/DL (ref 31–37)
MCV RBC AUTO: 99.8 FL
MONOCYTES # BLD AUTO: 0.4 X10(3) UL (ref 0.1–1)
MONOCYTES NFR BLD AUTO: 7.9 %
NEUTROPHILS # BLD AUTO: 2.96 X10 (3) UL (ref 1.5–7.7)
NEUTROPHILS # BLD AUTO: 2.96 X10(3) UL (ref 1.5–7.7)
NEUTROPHILS NFR BLD AUTO: 58.1 %
NONHDLC SERPL-MCNC: 120 MG/DL (ref ?–130)
OSMOLALITY SERPL CALC.SUM OF ELEC: 297 MOSM/KG (ref 275–295)
PLATELET # BLD AUTO: 142 10(3)UL (ref 150–450)
POTASSIUM SERPL-SCNC: 3.8 MMOL/L (ref 3.5–5.1)
PROT SERPL-MCNC: 6.1 G/DL (ref 6.4–8.2)
RBC # BLD AUTO: 4.12 X10(6)UL
SODIUM SERPL-SCNC: 143 MMOL/L (ref 136–145)
TRIGL SERPL-MCNC: 86 MG/DL (ref 30–149)
TSI SER-ACNC: 1.48 MIU/ML (ref 0.36–3.74)
VIT D+METAB SERPL-MCNC: 29 NG/ML (ref 30–100)
VLDLC SERPL CALC-MCNC: 14 MG/DL (ref 0–30)
WBC # BLD AUTO: 5.1 X10(3) UL (ref 4–11)

## 2023-02-21 PROCEDURE — 85025 COMPLETE CBC W/AUTO DIFF WBC: CPT

## 2023-02-21 PROCEDURE — 80053 COMPREHEN METABOLIC PANEL: CPT

## 2023-02-21 PROCEDURE — 3074F SYST BP LT 130 MM HG: CPT | Performed by: FAMILY MEDICINE

## 2023-02-21 PROCEDURE — 82306 VITAMIN D 25 HYDROXY: CPT

## 2023-02-21 PROCEDURE — 3078F DIAST BP <80 MM HG: CPT | Performed by: FAMILY MEDICINE

## 2023-02-21 PROCEDURE — 80061 LIPID PANEL: CPT

## 2023-02-21 PROCEDURE — 86364 TISS TRNSGLTMNASE EA IG CLAS: CPT

## 2023-02-21 PROCEDURE — 99214 OFFICE O/P EST MOD 30 MIN: CPT | Performed by: FAMILY MEDICINE

## 2023-02-21 PROCEDURE — 3008F BODY MASS INDEX DOCD: CPT | Performed by: FAMILY MEDICINE

## 2023-02-21 PROCEDURE — 36415 COLL VENOUS BLD VENIPUNCTURE: CPT

## 2023-02-21 PROCEDURE — 84443 ASSAY THYROID STIM HORMONE: CPT

## 2023-02-21 PROCEDURE — 82784 ASSAY IGA/IGD/IGG/IGM EACH: CPT

## 2023-02-21 RX ORDER — HYOSCYAMINE SULFATE EXTENDED-RELEASE 0.38 MG/1
0.38 TABLET ORAL EVERY 12 HOURS PRN
Qty: 50 TABLET | Refills: 0 | Status: SHIPPED | OUTPATIENT
Start: 2023-02-21

## 2023-02-21 RX ORDER — ACETAZOLAMIDE 500 MG/1
CAPSULE, EXTENDED RELEASE ORAL
COMMUNITY
Start: 2023-01-26

## 2023-02-27 ENCOUNTER — LAB ENCOUNTER (OUTPATIENT)
Dept: LAB | Age: 48
End: 2023-02-27
Attending: FAMILY MEDICINE
Payer: COMMERCIAL

## 2023-02-27 DIAGNOSIS — R19.7 DIARRHEA, UNSPECIFIED TYPE: ICD-10-CM

## 2023-02-27 PROCEDURE — 87493 C DIFF AMPLIFIED PROBE: CPT

## 2023-03-02 ENCOUNTER — LAB ENCOUNTER (OUTPATIENT)
Dept: LAB | Age: 48
End: 2023-03-02
Attending: FAMILY MEDICINE
Payer: COMMERCIAL

## 2023-03-02 LAB — C DIFF TOX B STL QL: NEGATIVE

## 2023-03-02 PROCEDURE — 83993 ASSAY FOR CALPROTECTIN FECAL: CPT

## 2023-03-03 LAB — CALPROTECTIN STL-MCNT: 141 ΜG/G (ref ?–50)

## 2023-03-09 ENCOUNTER — LAB ENCOUNTER (OUTPATIENT)
Dept: LAB | Facility: HOSPITAL | Age: 48
End: 2023-03-09
Attending: INTERNAL MEDICINE
Payer: COMMERCIAL

## 2023-03-09 DIAGNOSIS — K58.0 IRRITABLE BOWEL SYNDROME WITH DIARRHEA: ICD-10-CM

## 2023-03-09 DIAGNOSIS — R19.7 DIARRHEA: Primary | ICD-10-CM

## 2023-03-09 LAB
ADENOVIRUS F 40/41 PCR: NEGATIVE
ASTROVIRUS PCR: NEGATIVE
C CAYETANENSIS DNA SPEC QL NAA+PROBE: NEGATIVE
CAMPY SP DNA.DIARRHEA STL QL NAA+PROBE: NEGATIVE
CRYPTOSP DNA SPEC QL NAA+PROBE: NEGATIVE
EAEC PAA PLAS AGGR+AATA ST NAA+NON-PRB: NEGATIVE
EC STX1+STX2 + H7 FLIC SPEC NAA+PROBE: NEGATIVE
ENTAMOEBA HISTOLYTICA PCR: NEGATIVE
EPEC EAE GENE STL QL NAA+NON-PROBE: NEGATIVE
ETEC LTA+ST1A+ST1B TOX ST NAA+NON-PROBE: NEGATIVE
GIARDIA LAMBLIA PCR: NEGATIVE
NOROVIRUS GI/GII PCR: NEGATIVE
P SHIGELLOIDES DNA STL QL NAA+PROBE: NEGATIVE
ROTAVIRUS A PCR: NEGATIVE
SALMONELLA DNA SPEC QL NAA+PROBE: NEGATIVE
SAPOVIRUS PCR: NEGATIVE
SHIGELLA SP+EIEC IPAH ST NAA+NON-PROBE: NEGATIVE
V CHOLERAE DNA SPEC QL NAA+PROBE: NEGATIVE
VIBRIO DNA SPEC NAA+PROBE: NEGATIVE
YERSINIA DNA SPEC NAA+PROBE: NEGATIVE

## 2023-03-09 PROCEDURE — 87272 CRYPTOSPORIDIUM AG IF: CPT

## 2023-03-09 PROCEDURE — 87329 GIARDIA AG IA: CPT

## 2023-03-09 PROCEDURE — 87507 IADNA-DNA/RNA PROBE TQ 12-25: CPT

## 2023-07-28 DIAGNOSIS — M76.61 ACHILLES TENDINITIS, RIGHT LEG: ICD-10-CM

## 2023-07-28 DIAGNOSIS — M76.62 ACHILLES TENDINITIS, LEFT LEG: Primary | ICD-10-CM

## 2023-08-30 ENCOUNTER — HOSPITAL ENCOUNTER (OUTPATIENT)
Dept: MAMMOGRAPHY | Age: 48
Discharge: HOME | End: 2023-08-30
Attending: OBSTETRICS & GYNECOLOGY
Payer: COMMERCIAL

## 2023-08-30 DIAGNOSIS — Z12.31 ENCOUNTER FOR SCREENING MAMMOGRAM FOR MALIGNANT NEOPLASM OF BREAST: ICD-10-CM

## 2023-08-30 PROCEDURE — 77063 BREAST TOMOSYNTHESIS BI: CPT | Performed by: OBSTETRICS & GYNECOLOGY

## 2023-08-30 PROCEDURE — 77067 SCR MAMMO BI INCL CAD: CPT | Performed by: OBSTETRICS & GYNECOLOGY

## 2023-09-12 ENCOUNTER — HOSPITAL ENCOUNTER (OUTPATIENT)
Dept: ULTRASOUND IMAGING | Facility: HOSPITAL | Age: 48
Discharge: HOME | End: 2023-09-12
Attending: PODIATRIST
Payer: COMMERCIAL

## 2023-09-12 DIAGNOSIS — M76.61 ACHILLES TENDINITIS, RIGHT LEG: ICD-10-CM

## 2023-09-12 PROCEDURE — 76881 US COMPL JOINT R-T W/IMG: CPT | Performed by: PODIATRIST

## 2023-09-13 ENCOUNTER — ORDER TRANSCRIPTION (OUTPATIENT)
Dept: PHYSICAL THERAPY | Facility: HOSPITAL | Age: 48
End: 2023-09-13

## 2023-09-13 DIAGNOSIS — M76.60 ACHILLES TENDON PAIN: Primary | ICD-10-CM

## 2023-09-14 ENCOUNTER — TELEPHONE (OUTPATIENT)
Dept: PHYSICAL THERAPY | Facility: HOSPITAL | Age: 48
End: 2023-09-14

## 2023-09-14 ENCOUNTER — PATIENT MESSAGE (OUTPATIENT)
Dept: PHYSICAL THERAPY | Facility: HOSPITAL | Age: 48
End: 2023-09-14

## 2023-09-14 DIAGNOSIS — Z00.00 ANNUAL PHYSICAL EXAM: Primary | ICD-10-CM

## 2023-09-15 ENCOUNTER — OFFICE VISIT (OUTPATIENT)
Dept: PHYSICAL THERAPY | Facility: HOSPITAL | Age: 48
End: 2023-09-15
Attending: PODIATRIST
Payer: COMMERCIAL

## 2023-09-15 DIAGNOSIS — M76.60 ACHILLES TENDON PAIN: Primary | ICD-10-CM

## 2023-09-15 PROCEDURE — 97110 THERAPEUTIC EXERCISES: CPT | Performed by: PHYSICAL THERAPIST

## 2023-09-15 PROCEDURE — 97161 PT EVAL LOW COMPLEX 20 MIN: CPT | Performed by: PHYSICAL THERAPIST

## 2023-09-15 PROCEDURE — 97140 MANUAL THERAPY 1/> REGIONS: CPT | Performed by: PHYSICAL THERAPIST

## 2023-09-18 ENCOUNTER — OFFICE VISIT (OUTPATIENT)
Dept: PHYSICAL THERAPY | Facility: HOSPITAL | Age: 48
End: 2023-09-18
Attending: PODIATRIST
Payer: COMMERCIAL

## 2023-09-18 PROCEDURE — 97140 MANUAL THERAPY 1/> REGIONS: CPT | Performed by: PHYSICAL THERAPIST

## 2023-09-18 PROCEDURE — 97035 APP MDLTY 1+ULTRASOUND EA 15: CPT | Performed by: PHYSICAL THERAPIST

## 2023-09-25 ENCOUNTER — OFFICE VISIT (OUTPATIENT)
Dept: PHYSICAL THERAPY | Facility: HOSPITAL | Age: 48
End: 2023-09-25
Attending: PODIATRIST
Payer: COMMERCIAL

## 2023-09-25 PROCEDURE — 97140 MANUAL THERAPY 1/> REGIONS: CPT | Performed by: PHYSICAL THERAPIST

## 2023-09-25 PROCEDURE — 97035 APP MDLTY 1+ULTRASOUND EA 15: CPT | Performed by: PHYSICAL THERAPIST

## 2023-09-25 NOTE — PROGRESS NOTES
2023    Dx:  Achilles tendon pain (M76.60)                Authorized # of Visits:  12 visits on POC          Next MD visit: none   Fall Risk: standard         Precautions:  general, high risk for PT tears, scoliosis  Medication Changes since last visit?: No    Subjective: States she tried the Elliptical and was very sore after that. Did the stationary bike and was ok. States she can't wear the ankle brace because it rubs against her achilles tendon and lateral ankle. Frustrated that she can't work out. Pain Ratin-5/10 VAS    Objective:      2023  Visit #   2 2023  Visit #2   Manual Therapy STM calf and AT L    Talus mobs STM calf and AT L    Talus mobs   Therapeutic Exercise     Therapeutic Activity     Neuromuscular Education     Modalities US x 8 min, 1.2 W/cm, continuous US x 8 min, 1.2 W/cm, continuous   HEP Eccentric CR's in sitting  Eccecntric CRs in standing          Assessment: No adverse effects to treatment. Advised the patient to remove the metal stay in the lateral aspect of the ankle brace to improve tolerate to the brace. Continues to be significantly TTP of the L AT and the medial aspect of the soleus. Will add QUINTON activities as time permits at the next visit. Goals: The pt will display a bilateral heel toe gait pattern without gross deviation. The pt will be able to walk a 1 mile without an increase in pain. The pt will report 75% decrease in symptoms. The pt will be independent in her HEP. The pt will be able to perform a single leg eccentric control activity. Frequency / Duration: Patient will be seen for 1-2 x/week or a total of 12 visits over a 90 day period. Treatment will include: Manual Therapy; Therapeutic Exercises; Neuromuscular Re-education;  Therapeutic Activity; Gait Training; Electrical Stim; Patient education; Home exercise program instruction;     Education or treatment limitation: None  Rehab Potential:       Certification From:  To:12/14/2023        Charges: Man2 (23), US1 (8)       Total Timed Treatment: 31 min  Total Treatment Time: 31 min      FOR REFERENCE ONLY     Referring Physician: Dr. Goyal ref. provider found  Diagnosis:  Achilles tendon pain (M76.60)       Date of Onset: 1 year ago Date of Service: 9/15/2023     PATIENT SUMMARY:   Jeremiah Hamilton is a 52year old y/o female who presents to therapy today with complaints of L AT pain. States she is having a hard time walking. States her pain returned when she started to work out after getting out of her boot. R AT is also started to bother her. States she has stopped working out totally at this time. Roselee Willson Boot fist and then wore a cast boot and didn't run/workouts. Sees the podiatrist today. Was having back pain, got a massage which helped. Worse in the morning. Pt describes pain level 4-5/10 VAS. Wears wedges/heels at work. Wears Amy Saas at [employer]. History of current condition: insidious onset, progressively worsening pain x1 year  Current functional limitations include limited ability to stand, walk unable to work out. Aurora Marshall describes prior level of function independent in all activities, runs and works out at [employer] regularly. Pt goals include decrease pain, return to workouts. .  Past medical history was reviewed with Aurora Marshall. Significant findings include   Past Medical History:   Diagnosis Date    Esophageal reflux     Optic disc hemorrhage, left 5/8/2021    PVCs (premature ventricular contractions)     pt not taking medication     No past surgical history on file. 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 She walks with an antalgic gait pattern and displays decrease push off during the late phase of ambulation on the L LE. She  is very TTP of the muscular-tendonitis junction and displays a rigid mid-foot.   She will benefit from skilled PT to decrease irritation at the AT and improve her ability to eccentrically load the AT and return her to her PLOF.     Precautions:  None     OBJECTIVE:   Observation: anterior pelvic tilt, scoliosis curvature    Lumbar SB L limited R side glide limited  Gait: antalgic with decreased WB through the L LE, decreased push off L  Palpation: very TTP of the M-T junction, L AT  Sensation: intact to light touch    AROM:  Foot/Ankle   DF: R 7; L 5  PF: R 40; L 40  INV: R NT; L NT  EV: R NT; L NT  Great toe ext: R 5; L 2         Accessory motion: decreased talus and midfoot mobility bilaterally    Flexibility:   Hamstrings: R long; L long  5Gastroc: R short; L short    Strength/MMT:   Hip Knee Foot/Ankle   Flexion: R 5/5; L 5/5   Flexion: R 5/5; L 5/5  Extension: R 5/5; L 5/5    DF: R 5/5; L 5/5  PF: R 5/5; L 5/5  INV: R 5/5; L 4/5  EV: R 5/5; L 4/5  Great toe ext: R 5/5; L 5/5     Special tests: unable to perform a SL eccentric gastro lower activity  Functional squat 3/5  L ADT (-), + PADT    Balance: SLS: R 30 sec, L 30 sec

## 2023-09-29 ENCOUNTER — OFFICE VISIT (OUTPATIENT)
Dept: PHYSICAL THERAPY | Facility: HOSPITAL | Age: 48
End: 2023-09-29
Attending: PODIATRIST
Payer: COMMERCIAL

## 2023-09-29 PROCEDURE — 97112 NEUROMUSCULAR REEDUCATION: CPT | Performed by: PHYSICAL THERAPIST

## 2023-09-29 PROCEDURE — 97140 MANUAL THERAPY 1/> REGIONS: CPT | Performed by: PHYSICAL THERAPIST

## 2023-09-29 PROCEDURE — 97035 APP MDLTY 1+ULTRASOUND EA 15: CPT | Performed by: PHYSICAL THERAPIST

## 2023-09-29 NOTE — PROGRESS NOTES
2023    Dx:  Achilles tendon pain (M76.60)                Authorized # of Visits:  12 visits on POC          Next MD visit: none   Fall Risk: standard         Precautions:  general, high risk for PT tears, scoliosis  Medication Changes since last visit?: No    Subjective: States she did hot yoga and is very sore in the AT L and her LB. States her ankle brace isn't really helping and she feels the more she works outs the more pain she is in. Pain Ratin-5/10 VAS    Objective:      2023  Visit #   2 2023  Visit #2 2023  Visit #3   Manual Therapy STM calf and AT L    Talus mobs STM calf and AT L    Talus mobs STM calf and AT L    Talus mobs   Therapeutic Exercise      Therapeutic Activity      Neuromuscular Education   L SL, R glut max    L SL, knee towards knee   Modalities US x 8 min, 1.2 W/cm, continuous US x 8 min, 1.2 W/cm, continuous US x 8 min, 1.2 W/cm, continuous   HEP Eccentric CR's in sitting  Eccecntric CRs in standing  L SL knee towards knee         Assessment: No adverse effects to treatment. Discussed the patient returning to the CAM walker secondary to increasing pain from workouts. The pt is very TTP of the AT at the muscular-tendinous junction and the medial aspect of the soleus. Advised to wear shoe/CAM walker when working out and to avoid over 4-5/10 VAS in the AT area. Added frontal plane activities to improve foot and back pain. Goals: The pt will display a bilateral heel toe gait pattern without gross deviation. The pt will be able to walk a 1 mile without an increase in pain. The pt will report 75% decrease in symptoms. The pt will be independent in her HEP. The pt will be able to perform a single leg eccentric control activity. Frequency / Duration: Patient will be seen for 1-2 x/week or a total of 12 visits over a 90 day period. Treatment will include: Manual Therapy; Therapeutic Exercises; Neuromuscular Re-education;  Therapeutic Activity; Gait Training; Electrical Stim; Patient education; Home exercise program instruction;     Education or treatment limitation: None  Rehab Potential:       Certification From: 7/00/8482  To:12/14/2023        Charges: [facility] (23), US1 (8), NM1(15)     Total Timed Treatment: 46 min  Total Treatment Time: 46 min      FOR REFERENCE ONLY     Referring Physician: Dr. Goyal ref. provider found  Diagnosis:  Achilles tendon pain (M76.60)       Date of Onset: 1 year ago Date of Service: 9/15/2023     PATIENT SUMMARY:   Colletta Birchwood is a 52year old y/o female who presents to therapy today with complaints of L AT pain. States she is having a hard time walking. States her pain returned when she started to work out after getting out of her boot. R AT is also started to bother her. States she has stopped working out totally at this time. Magi Lionel Boot fist and then wore a cast boot and didn't run/workouts. Sees the podiatrist today. Was having back pain, got a massage which helped. Worse in the morning. Pt describes pain level 4-5/10 VAS. Wears wedges/heels at work. Wears Jearld Sauers at [employer]. History of current condition: insidious onset, progressively worsening pain x1 year  Current functional limitations include limited ability to stand, walk unable to work out. Render Dears describes prior level of function independent in all activities, runs and works out at [employer] regularly. Pt goals include decrease pain, return to workouts. .  Past medical history was reviewed with Render Dears. Significant findings include   Past Medical History:   Diagnosis Date    Esophageal reflux     Optic disc hemorrhage, left 5/8/2021    PVCs (premature ventricular contractions)     pt not taking medication     No past surgical history on file. ASSESSMENT:   Dr. Makayla [facility] presents to therapy with a 1 year long hx of L AT pain that is worsening in nature.   She walks with an antalgic gait pattern and displays decrease push off during the late phase of ambulation on the L LE. She  is very TTP of the muscular-tendonitis junction and displays a rigid mid-foot. She will benefit from skilled PT to decrease irritation at the AT and improve her ability to eccentrically load the AT and return her to her PLOF.     Precautions:  None     OBJECTIVE:   Observation: anterior pelvic tilt, scoliosis curvature    Lumbar SB L limited R side glide limited  Gait: antalgic with decreased WB through the L LE, decreased push off L  Palpation: very TTP of the M-T junction, L AT  Sensation: intact to light touch    AROM:  Foot/Ankle   DF: R 7; L 5  PF: R 40; L 40  INV: R NT; L NT  EV: R NT; L NT  Great toe ext: R 5; L 2         Accessory motion: decreased talus and midfoot mobility bilaterally    Flexibility:   Hamstrings: R long; L long  5Gastroc: R short; L short    Strength/MMT:   Hip Knee Foot/Ankle   Flexion: R 5/5; L 5/5   Flexion: R 5/5; L 5/5  Extension: R 5/5; L 5/5    DF: R 5/5; L 5/5  PF: R 5/5; L 5/5  INV: R 5/5; L 4/5  EV: R 5/5; L 4/5  Great toe ext: R 5/5; L 5/5     Special tests: unable to perform a SL eccentric gastro lower activity  Functional squat 3/5  L ADT (-), + PADT    Balance: SLS: R 30 sec, L 30 sec

## 2023-10-03 ENCOUNTER — APPOINTMENT (OUTPATIENT)
Dept: PHYSICAL THERAPY | Facility: HOSPITAL | Age: 48
End: 2023-10-03
Attending: PODIATRIST
Payer: COMMERCIAL

## 2023-10-09 RX ORDER — NORGESTIMATE AND ETHINYL ESTRADIOL 7DAYSX3 28
1 KIT ORAL DAILY
Qty: 84 TABLET | Refills: 0 | OUTPATIENT
Start: 2023-10-09

## 2023-10-13 ENCOUNTER — OFFICE VISIT (OUTPATIENT)
Dept: PHYSICAL THERAPY | Facility: HOSPITAL | Age: 48
End: 2023-10-13
Attending: PODIATRIST
Payer: COMMERCIAL

## 2023-10-13 PROCEDURE — 97035 APP MDLTY 1+ULTRASOUND EA 15: CPT | Performed by: PHYSICAL THERAPIST

## 2023-10-13 PROCEDURE — 97140 MANUAL THERAPY 1/> REGIONS: CPT | Performed by: PHYSICAL THERAPIST

## 2023-10-13 NOTE — PROGRESS NOTES
10/13/2023  Dx:  Achilles tendon pain (M76.60)                Authorized # of Visits:  12 visits on POC          Next MD visit: none   Fall Risk: standard         Precautions:  general, high risk for PT tears, scoliosis  Medication Changes since last visit?: No    Subjective: States she did wear the boot for a couple of days after the last session and that seems to help. Now can wear the brace and it is less irritating. Had a stressful week so she ran on the TM. States she feels better running faster than slower. Pain Ratin-5/10 VAS    Objective:      2023  Visit #   2 2023  Visit #2 2023  Visit #3 10/13/2023  Visit #4   Manual Therapy STM calf and AT L    Talus mobs STM calf and AT L    Talus mobs STM calf and AT L    Talus mobs STM calf and AT L    Talus mobs   Therapeutic Exercise       Therapeutic Activity       Neuromuscular Education   L SL, R glut max    L SL, knee towards knee    Modalities US x 8 min, 1.2 W/cm, continuous US x 8 min, 1.2 W/cm, continuous US x 8 min, 1.2 W/cm, continuous US x 8 min, 1.2 W/cm, continuous   HEP Eccentric CR's in sitting  Eccecntric CRs in standing  L SL knee towards knee L SL knee towards knee         Assessment: No adverse effects to treatment. Continues to have TTP at the AT transition from the muscle to the tendon. Advised to continue her QUINTON HEP and use the boot intermittently. Goals: The pt will display a bilateral heel toe gait pattern without gross deviation. The pt will be able to walk a 1 mile without an increase in pain. The pt will report 75% decrease in symptoms. The pt will be independent in her HEP. The pt will be able to perform a single leg eccentric control activity. Frequency / Duration: Patient will be seen for 1-2 x/week or a total of 12 visits over a 90 day period. Treatment will include: Manual Therapy; Therapeutic Exercises; Neuromuscular Re-education;  Therapeutic Activity; Gait Training; Electrical Stim; Patient education; Home exercise program instruction;     Education or treatment limitation: None  Rehab Potential:       Certification From: 8/79/3824  To:12/14/2023        Charges: [facility] (45), US1 (8),    Total Timed Treatment: 46 min  Total Treatment Time: 46 min      FOR REFERENCE ONLY     Referring Physician: Dr. Goyal ref. provider found  Diagnosis:  Achilles tendon pain (M76.60)       Date of Onset: 1 year ago Date of Service: 9/15/2023     PATIENT SUMMARY:   Richard Fowler is a 52year old y/o female who presents to therapy today with complaints of L AT pain. States she is having a hard time walking. States her pain returned when she started to work out after getting out of her boot. R AT is also started to bother her. States she has stopped working out totally at this time. Lowella Half Boot fist and then wore a cast boot and didn't run/workouts. Sees the podiatrist today. Was having back pain, got a massage which helped. Worse in the morning. Pt describes pain level 4-5/10 VAS. Wears wedges/heels at work. Wears Bob Battiest at [employer]. History of current condition: insidious onset, progressively worsening pain x1 year  Current functional limitations include limited ability to stand, walk unable to work out. Gregory Diallo describes prior level of function independent in all activities, runs and works out at [employer] regularly. Pt goals include decrease pain, return to workouts. .  Past medical history was reviewed with Gregory Diallo. Significant findings include   Past Medical History:   Diagnosis Date    Esophageal reflux     Optic disc hemorrhage, left 5/8/2021    PVCs (premature ventricular contractions)     pt not taking medication     No past surgical history on file. 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   She walks with an antalgic gait pattern and displays decrease push off during the late phase of ambulation on the L LE. She  is very TTP of the muscular-tendonitis junction and displays a rigid mid-foot. She will benefit from skilled PT to decrease irritation at the AT and improve her ability to eccentrically load the AT and return her to her PLOF.     Precautions:  None     OBJECTIVE:   Observation: anterior pelvic tilt, scoliosis curvature    Lumbar SB L limited R side glide limited  Gait: antalgic with decreased WB through the L LE, decreased push off L  Palpation: very TTP of the M-T junction, L AT  Sensation: intact to light touch    AROM:  Foot/Ankle   DF: R 7; L 5  PF: R 40; L 40  INV: R NT; L NT  EV: R NT; L NT  Great toe ext: R 5; L 2         Accessory motion: decreased talus and midfoot mobility bilaterally    Flexibility:   Hamstrings: R long; L long  5Gastroc: R short; L short    Strength/MMT:   Hip Knee Foot/Ankle   Flexion: R 5/5; L 5/5   Flexion: R 5/5; L 5/5  Extension: R 5/5; L 5/5    DF: R 5/5; L 5/5  PF: R 5/5; L 5/5  INV: R 5/5; L 4/5  EV: R 5/5; L 4/5  Great toe ext: R 5/5; L 5/5     Special tests: unable to perform a SL eccentric gastro lower activity  Functional squat 3/5  L ADT (-), + PADT    Balance: SLS: R 30 sec, L 30 sec

## 2023-10-17 ENCOUNTER — TELEPHONE (OUTPATIENT)
Dept: PHYSICAL THERAPY | Facility: HOSPITAL | Age: 48
End: 2023-10-17

## 2023-10-23 ENCOUNTER — TELEPHONE (OUTPATIENT)
Dept: OBGYN | Age: 48
End: 2023-10-23

## 2023-10-27 ENCOUNTER — OFFICE VISIT (OUTPATIENT)
Dept: PHYSICAL THERAPY | Facility: HOSPITAL | Age: 48
End: 2023-10-27
Attending: PODIATRIST
Payer: COMMERCIAL

## 2023-10-27 PROCEDURE — 97140 MANUAL THERAPY 1/> REGIONS: CPT | Performed by: PHYSICAL THERAPIST

## 2023-10-27 PROCEDURE — 97035 APP MDLTY 1+ULTRASOUND EA 15: CPT | Performed by: PHYSICAL THERAPIST

## 2023-10-27 PROCEDURE — 97110 THERAPEUTIC EXERCISES: CPT | Performed by: PHYSICAL THERAPIST

## 2023-10-27 NOTE — PROGRESS NOTES
10/27/2023  Patient Name: Kae Jasso  YOB: 1975          MRN number:  [id number]  Referring Physician:  No ref. provider found    Progresse Summary    Dx:  Achilles tendon pain (M76.60)                Authorized # of Visits:  12 visits on POC          Next MD visit: none   Fall Risk: standard         Precautions:  general, high risk for PT tears, scoliosis  Medication Changes since last visit?: No    Subjective: States she has been working out and so she is sore from that. But overall states her AT is improving. Has been wearing her brace and state that is not as cumbersome as it was before.    Pain Rating:  3-5/10 VAS    Objective:     AROM:  Foot/Ankle   DF: R 7; L 7  PF: R 40; L 40  INV: R NT; L NT  EV: R NT; L NT  Great toe ext: R 5; L 5         Accessory motion: decreased talus and midfoot mobility bilaterally    Flexibility:   Hamstrings: R long; L long  5Gastroc: R short; L short    Strength/MMT:   Hip Knee Foot/Ankle   Flexion: R 5/5; L 5/5   Flexion: R 5/5; L 5/5  Extension: R 5/5; L 5/5    DF: R 5/5; L 5/5  PF: R 5/5; L 5/5  INV: R 5/5; L 4/5  EV: R 5/5; L 4/5  Great toe ext: R 5/5; L 5/5     Special tests: unable to perform a SL eccentric gastro lower activity  Functional squat 3+/5  L ADT (-), (-)PADT    Balance: SLS: R 30 sec, L 30 sec       9/25/2023  Visit #2 9/29/2023  Visit #3 10/13/2023  Visit #4 10/27/2023  Visit #5   Manual Therapy STM calf and AT L    Talus mobs STM calf and AT L    Talus mobs STM calf and AT L    Talus mobs STM calf and AT L    Talus mobs    Graston Technique L medial calf and T AT   Therapeutic Exercise    Eccentric loading L LE   Therapeutic Activity       Neuromuscular Education  L SL, R glut max    L SL, knee towards knee     Modalities US x 8 min, 1.2 W/cm, continuous US x 8 min, 1.2 W/cm, continuous US x 8 min, 1.2 W/cm, continuous US x 8 min, 1.2 W/cm, continuous   HEP  L SL knee towards knee L SL knee towards knee          Assessment: Buck Whitmore Lazarus Blanc has completed 5 visits of PT and has reported some pain relief. She continues to have TTP at the AT transition from the muscle to the tendon. She also displays STR's in the medial calf and along the entire AT. She continues to work out but has modified her workouts to help avoid pain. She also has been given activities to improve alignment and muscle activation through the pelvic and core. She was able to tolerate single leg eccentric loading this date. She will benefit from continued PT to return to her PLOF. Recommend skilled PT 1 times per week for up to 8 additional visits. The pt is in agreement with the POC. Goals: The pt will display a bilateral heel toe gait pattern without gross deviation. MET 10/27/2023  The pt will be able to walk a 1 mile without an increase in pain. MET 10/27/2023  3. The pt will report 75% decrease in symptoms  PROGRESSING 10/27/2023.  4.  The pt will be independent in her HEP. MET 10/27/2023  5. The pt will be able to perform a single leg eccentric control activity. PROGRESSING 10/27/2023  6. The pt will return to running without an increase in symptoms. Frequency / Duration: Patient will be seen for 1-2 x/week or a total of 8 visits over a 90 day period. Treatment will include: Manual Therapy; Therapeutic Exercises; Neuromuscular Re-education; Therapeutic Activity; Gait Training; Electrical Stim; Patient education; Home exercise program instruction;     Education or treatment limitation: None  Rehab Potential:     Charges: Man2 (25), US1 (8), TE(10)   Total Timed Treatment: 43 min  Total Treatment Time: 43 min         Patient was advised of these findings, precautions, and treatment options and has agreed to actively participate in planning and for this course of care. Thank you for your referral. Please co-sign or sign and return this letter via fax as soon as possible to [phone].  If you have any questions, please contact me at Dept: [phone]. Sincerely,  Sea Reinoso PT    Electronically signed by therapist: Sea Reinoso PT    [de-identified] certification required: Yes  I certify the need for these services furnished under this plan of treatment and while under my care.     X___________________________________________________ Date____________________    Certification From: 74/49/0495  To:1/25/2024

## 2023-11-03 ENCOUNTER — OFFICE VISIT (OUTPATIENT)
Dept: PHYSICAL THERAPY | Facility: HOSPITAL | Age: 48
End: 2023-11-03
Attending: PODIATRIST
Payer: COMMERCIAL

## 2023-11-03 PROCEDURE — 97035 APP MDLTY 1+ULTRASOUND EA 15: CPT | Performed by: PHYSICAL THERAPIST

## 2023-11-03 PROCEDURE — 97140 MANUAL THERAPY 1/> REGIONS: CPT | Performed by: PHYSICAL THERAPIST

## 2023-11-03 NOTE — PROGRESS NOTES
11/3/2023    Patient Name: Keri Tiwari  YOB: 1975          MRN number:  [id number]  Referring Physician:  No ref. provider found      Dx:  Achilles tendon pain (M76.60)                Authorized # of Visits:  12 visits on POC          Next MD visit: none   Fall Risk: standard         Precautions:  general, high risk for PT tears, scoliosis  Medication Changes since last visit?: No    Subjective: States she saw the Podiatrist who wanted her to continue PT for 6 more weeks. Reports she also wanted her to have an in office treatment weekly to help remodel the tendon  Reports she does have pain when she runs at [facility]. Reports she is avoiding plymometric at the gym.     Pain Rating:  3-5/10 VAS    Objective:     AROM:  Foot/Ankle   DF: R 7; L 7  PF: R 40; L 40  INV: R NT; L NT  EV: R NT; L NT  Great toe ext: R 5; L 5         Accessory motion: decreased talus and midfoot mobility bilaterally    Flexibility:   Hamstrings: R long; L long  5Gastroc: R short; L short    Strength/MMT:   Hip Knee Foot/Ankle   Flexion: R 5/5; L 5/5   Flexion: R 5/5; L 5/5  Extension: R 5/5; L 5/5    DF: R 5/5; L 5/5  PF: R 5/5; L 5/5  INV: R 5/5; L 4/5  EV: R 5/5; L 4/5  Great toe ext: R 5/5; L 5/5     Special tests: unable to perform a SL eccentric gastro lower activity  Functional squat 3+/5  L ADT (-), (-)PADT    Balance: SLS: R 30 sec, L 30 sec       10/13/2023  Visit #4 10/27/2023  Visit #5 11/3/2023  Visit #6   Manual Therapy STM calf and AT L    Talus mobs STM calf and AT L    Talus mobs    Graston Technique L medial calf and T AT STM calf and AT L    Talus mobs    Graston Technique L medial calf and T AT   Therapeutic Exercise  Eccentric loading L LE    Therapeutic Activity      Neuromuscular Education      Modalities US x 8 min, 1.2 W/cm, continuous US x 8 min, 1.2 W/cm, continuous US x 8 min, 1.2 W/cm, continuous   HEP L SL knee towards knee           Assessment: Claire Kate has completed 6 visits of PT and has reported some pain relief. She continues to have TTP at the AT transition from the muscle to the tendon. She also displays STR's in the medial calf and along the entire AT. She continues to have pain when she runs. She is less TTP of the AT than at the eval and tendon appears thicker. Also continues to be TTP of the medial/lower gastroc. Will continue PT as tolerated. Goals: The pt will display a bilateral heel toe gait pattern without gross deviation. MET 10/27/2023  The pt will be able to walk a 1 mile without an increase in pain. MET 10/27/2023  3. The pt will report 75% decrease in symptoms  PROGRESSING 10/27/2023.  4.  The pt will be independent in her HEP. MET 10/27/2023  5. The pt will be able to perform a single leg eccentric control activity. PROGRESSING 10/27/2023  6. The pt will return to running without an increase in symptoms. Frequency / Duration: Patient will be seen for 1-2 x/week or a total of 8 visits over a 90 day period. Treatment will include: Manual Therapy; Therapeutic Exercises; Neuromuscular Re-education; Therapeutic Activity; Gait Training; Electrical Stim; Patient education; Home exercise program instruction;     Education or treatment limitation: None  Rehab Potential:     Charges: Man2 (30), US1 (8),  Total Timed Treatment: 38 min  Total Treatment Time: 38 min         Patient was advised of these findings, precautions, and treatment options and has agreed to actively participate in planning and for this course of care. Thank you for your referral. Please co-sign or sign and return this letter via fax as soon as possible to [phone]. If you have any questions, please contact me at Dept: [phone].     Sincerely,  Harris Levi PT    Electronically signed by therapist: Harris Levi PT    [de-identified] certification required: Yes  I certify the need for these services furnished under this plan of treatment and while under my care.    X___________________________________________________ Date____________________    Certification From: 55/95/6137  To:1/25/2024

## 2023-11-07 ENCOUNTER — OFFICE VISIT (OUTPATIENT)
Dept: PHYSICAL THERAPY | Facility: HOSPITAL | Age: 48
End: 2023-11-07
Attending: PODIATRIST
Payer: COMMERCIAL

## 2023-11-07 PROCEDURE — 97035 APP MDLTY 1+ULTRASOUND EA 15: CPT | Performed by: PHYSICAL THERAPIST

## 2023-11-07 PROCEDURE — 97140 MANUAL THERAPY 1/> REGIONS: CPT | Performed by: PHYSICAL THERAPIST

## 2023-11-07 NOTE — PROGRESS NOTES
11/7/2023    Patient Name: Kae Jasso  YOB: 1975          MRN number:  [id number]  Referring Physician:  No ref. provider found      Dx:  Achilles tendon pain (M76.60)                Authorized # of Visits:  12 visits on POC          Next MD visit: none   Fall Risk: standard         Precautions:  general, high risk for PT tears, scoliosis  Medication Changes since last visit?: No    Subjective: States overall she is feeling better. States she went without her ankle brace yesterday and did ok. State she continue to get sore when she runs. Ran 3 days in a row and is more sore from that. Reports she can try to do the bike instead of running on the TM when at [facility] going forward.     Pain Rating:  3-5/10 VAS    Objective:     AROM:  Foot/Ankle   DF: R 7; L 7  PF: R 40; L 40  INV: R NT; L NT  EV: R NT; L NT  Great toe ext: R 5; L 5         Accessory motion: decreased talus and midfoot mobility bilaterally    Flexibility:   Hamstrings: R long; L long  5Gastroc: R short; L short    Strength/MMT:   Hip Knee Foot/Ankle   Flexion: R 5/5; L 5/5   Flexion: R 5/5; L 5/5  Extension: R 5/5; L 5/5    DF: R 5/5; L 5/5  PF: R 5/5; L 5/5  INV: R 5/5; L 4/5  EV: R 5/5; L 4/5  Great toe ext: R 5/5; L 5/5     Special tests: unable to perform a SL eccentric gastro lower activity  Functional squat 3+/5  L ADT (-), (-)PADT    Balance: SLS: R 30 sec, L 30 sec       10/13/2023  Visit #4 10/27/2023  Visit #5 11/3/2023  Visit #6 11/7/2023  Visit #7   Manual Therapy STM calf and AT L    Talus mobs STM calf and AT L    Talus mobs    Graston Technique L medial calf and T AT STM calf and AT L    Talus mobs    Graston Technique L medial calf and T AT STM calf and AT L    Talus mobs    Graston Technique L medial calf and T AT   Therapeutic Exercise  Eccentric loading L LE     Therapeutic Activity       Neuromuscular Education       Modalities US x 8 min, 1.2 W/cm, continuous US x 8 min, 1.2 W/cm, continuous US x 8 min, 1.2 W/cm, continuous US x 8 min, 1.2 W/cm, continuous   HEP L SL knee towards knee            Assessment: No adverse effects to treatment. The pt reports less pain than at the onset of therapy. She continue to display TTP of the mid shaft of the AT. She was advised to avoid running on consecutive days and alternate with the bike to decrease irritation at the AT. Also advised to be more consistent with her eccentric strengthening activities. Goals: The pt will display a bilateral heel toe gait pattern without gross deviation. MET 10/27/2023  The pt will be able to walk a 1 mile without an increase in pain. MET 10/27/2023  3. The pt will report 75% decrease in symptoms  PROGRESSING 10/27/2023.  4.  The pt will be independent in her HEP. MET 10/27/2023  5. The pt will be able to perform a single leg eccentric control activity. PROGRESSING 10/27/2023  6. The pt will return to running without an increase in symptoms. Frequency / Duration: Patient will be seen for 1-2 x/week or a total of 8 visits over a 90 day period. Treatment will include: Manual Therapy; Therapeutic Exercises; Neuromuscular Re-education;  Therapeutic Activity; Gait Training; Electrical Stim; Patient education; Home exercise program instruction;     Education or treatment limitation: None  Rehab Potential:     Charges: TFH5 (23), US1 (8),  Total Timed Treatment: 31 min  Total Treatment Time: 31 min       Certification From: 48/17/7580  To:1/25/2024

## 2023-11-21 ENCOUNTER — OFFICE VISIT (OUTPATIENT)
Dept: PHYSICAL THERAPY | Facility: HOSPITAL | Age: 48
End: 2023-11-21
Attending: PODIATRIST
Payer: COMMERCIAL

## 2023-11-21 PROCEDURE — 97140 MANUAL THERAPY 1/> REGIONS: CPT | Performed by: PHYSICAL THERAPIST

## 2023-11-21 PROCEDURE — 97035 APP MDLTY 1+ULTRASOUND EA 15: CPT | Performed by: PHYSICAL THERAPIST

## 2023-12-01 ENCOUNTER — APPOINTMENT (OUTPATIENT)
Dept: PHYSICAL THERAPY | Facility: HOSPITAL | Age: 48
End: 2023-12-01
Attending: PODIATRIST
Payer: COMMERCIAL

## 2023-12-08 ENCOUNTER — OFFICE VISIT (OUTPATIENT)
Dept: PHYSICAL THERAPY | Facility: HOSPITAL | Age: 48
End: 2023-12-08
Attending: PHYSICAL THERAPIST
Payer: COMMERCIAL

## 2023-12-08 PROCEDURE — 97035 APP MDLTY 1+ULTRASOUND EA 15: CPT | Performed by: PHYSICAL THERAPIST

## 2023-12-08 PROCEDURE — 97140 MANUAL THERAPY 1/> REGIONS: CPT | Performed by: PHYSICAL THERAPIST

## 2023-12-08 NOTE — PROGRESS NOTES
12/8/2023    Patient Name: Kari Mcfarland  YOB: 1975          MRN number:  [id number]  Referring Physician:  No ref. provider found      Dx:  Achilles tendon pain (M76.60)                Authorized # of Visits:  12 visits on POC          Next MD visit: none   Fall Risk: standard         Precautions:  general, high risk for PT tears, scoliosis  Medication Changes since last visit?: No    Subjective: States overall she is feeling better. States she was good all week and then she ran yesterday and states he was sore after that. States she is better overall. Pain Rating:  3/10 VAS    Objective:     AROM:  Foot/Ankle   DF: R 7; L 7  PF: R 40; L 40  INV: R NT; L NT  EV: R NT; L NT  Great toe ext: R 5; L 5         Accessory motion: decreased talus and midfoot mobility bilaterally    Flexibility:   Hamstrings: R long; L long  5Gastroc: R short; L short    Strength/MMT:   Hip Knee Foot/Ankle   Flexion: R 5/5; L 5/5   Flexion: R 5/5; L 5/5  Extension: R 5/5; L 5/5    DF: R 5/5; L 5/5  PF: R 5/5; L 5/5  INV: R 5/5; L 4/5  EV: R 5/5; L 4/5  Great toe ext: R 5/5; L 5/5     Special tests: unable to perform a SL eccentric gastro lower activity  Functional squat 3+/5  L ADT (-), (-)PADT    Balance: SLS: R 30 sec, L 30 sec       11/3/2023  Visit #6 11/7/2023  Visit #7 12/8/2023  Visit #8   Manual Therapy STM calf and AT L    Talus mobs    Graston Technique L medial calf and T AT STM calf and AT L    Talus mobs    Graston Technique L medial calf and T AT STM calf and AT L    Talus mobs    Graston Technique L medial calf and T AT   Therapeutic Exercise      Therapeutic Activity      Neuromuscular Education      Modalities US x 8 min, 1.2 W/cm, continuous US x 8 min, 1.2 W/cm, continuous US x 8 min, 1.2 W/cm, continuous   HEP   Eccentric loading L AT         Assessment: No adverse effects to treatment. The pt reports less pain than at the onset of therapy.   She displays less TTP of the mid shaft of the AT and has been running consistently with less pain (although did report more pain yesterday). Advised to continue her eccentric loading of the AT 4x's per day to fully remodel the tendon. Goals: The pt will display a bilateral heel toe gait pattern without gross deviation. MET 10/27/2023  The pt will be able to walk a 1 mile without an increase in pain. MET 10/27/2023  3. The pt will report 75% decrease in symptoms  PROGRESSING 10/27/2023.  4.  The pt will be independent in her HEP. MET 10/27/2023  5. The pt will be able to perform a single leg eccentric control activity. PROGRESSING 10/27/2023  6. The pt will return to running without an increase in symptoms. Frequency / Duration: Patient will be seen for 1-2 x/week or a total of 8 visits over a 90 day period. Treatment will include: Manual Therapy; Therapeutic Exercises; Neuromuscular Re-education;  Therapeutic Activity; Gait Training; Electrical Stim; Patient education; Home exercise program instruction;     Education or treatment limitation: None  Rehab Potential:     Charges: QXC4 (30), US1 (8),  Total Timed Treatment: 38 min  Total Treatment Time: 38 min       Certification From: 28/55/4839  To:1/25/2024

## 2023-12-28 ENCOUNTER — TELEPHONE (OUTPATIENT)
Dept: OBGYN | Age: 48
End: 2023-12-28

## 2024-01-18 ENCOUNTER — TELEPHONE (OUTPATIENT)
Dept: OBGYN | Age: 49
End: 2024-01-18

## 2024-01-24 DIAGNOSIS — Z30.41 ENCOUNTER FOR SURVEILLANCE OF CONTRACEPTIVE PILLS: Primary | ICD-10-CM

## 2024-01-24 RX ORDER — NORGESTIMATE AND ETHINYL ESTRADIOL 7DAYSX3 28
1 KIT ORAL DAILY
Qty: 84 TABLET | Refills: 3 | Status: SHIPPED | OUTPATIENT
Start: 2024-01-24

## 2024-02-15 ENCOUNTER — HOSPITAL ENCOUNTER (OUTPATIENT)
Dept: GENERAL RADIOLOGY | Facility: HOSPITAL | Age: 49
Discharge: HOME | End: 2024-02-15
Attending: PHYSICAL MEDICINE & REHABILITATION
Payer: COMMERCIAL

## 2024-02-15 ENCOUNTER — OFFICE VISIT (OUTPATIENT)
Dept: PHYSICAL MEDICINE AND REHAB | Facility: CLINIC | Age: 49
End: 2024-02-15
Payer: COMMERCIAL

## 2024-02-15 ENCOUNTER — LAB ENCOUNTER (OUTPATIENT)
Dept: LAB | Facility: HOSPITAL | Age: 49
End: 2024-02-15
Attending: FAMILY MEDICINE
Payer: COMMERCIAL

## 2024-02-15 VITALS — WEIGHT: 138 LBS | BODY MASS INDEX: 24 KG/M2

## 2024-02-15 DIAGNOSIS — M76.32 ILIOTIBIAL BAND SYNDROME OF BOTH SIDES: ICD-10-CM

## 2024-02-15 DIAGNOSIS — Z00.00 ANNUAL PHYSICAL EXAM: ICD-10-CM

## 2024-02-15 DIAGNOSIS — M54.16 LUMBAR RADICULOPATHY: Primary | ICD-10-CM

## 2024-02-15 DIAGNOSIS — M54.16 LUMBAR RADICULOPATHY: ICD-10-CM

## 2024-02-15 DIAGNOSIS — M76.31 ILIOTIBIAL BAND SYNDROME OF BOTH SIDES: ICD-10-CM

## 2024-02-15 LAB
ALBUMIN SERPL-MCNC: 4.2 G/DL (ref 3.2–4.8)
ALBUMIN/GLOB SERPL: 1.8 {RATIO} (ref 1–2)
ALP LIVER SERPL-CCNC: 51 U/L
ALT SERPL-CCNC: 19 U/L
ANION GAP SERPL CALC-SCNC: 6 MMOL/L (ref 0–18)
AST SERPL-CCNC: 31 U/L (ref ?–34)
BASOPHILS # BLD AUTO: 0.05 X10(3) UL (ref 0–0.2)
BASOPHILS NFR BLD AUTO: 0.8 %
BILIRUB SERPL-MCNC: 0.4 MG/DL (ref 0.3–1.2)
BUN BLD-MCNC: 12 MG/DL (ref 9–23)
BUN/CREAT SERPL: 12.9 (ref 10–20)
CALCIUM BLD-MCNC: 9.2 MG/DL (ref 8.7–10.4)
CHLORIDE SERPL-SCNC: 106 MMOL/L (ref 98–112)
CHOLEST SERPL-MCNC: 227 MG/DL (ref ?–200)
CO2 SERPL-SCNC: 30 MMOL/L (ref 21–32)
CREAT BLD-MCNC: 0.93 MG/DL
DEPRECATED RDW RBC AUTO: 46.4 FL (ref 35.1–46.3)
EGFRCR SERPLBLD CKD-EPI 2021: 76 ML/MIN/1.73M2 (ref 60–?)
EOSINOPHIL # BLD AUTO: 0.2 X10(3) UL (ref 0–0.7)
EOSINOPHIL NFR BLD AUTO: 3.3 %
ERYTHROCYTE [DISTWIDTH] IN BLOOD BY AUTOMATED COUNT: 12.8 % (ref 11–15)
FASTING PATIENT LIPID ANSWER: NO
FASTING STATUS PATIENT QL REPORTED: NO
GLOBULIN PLAS-MCNC: 2.4 G/DL (ref 2.8–4.4)
GLUCOSE BLD-MCNC: 86 MG/DL (ref 70–99)
HCT VFR BLD AUTO: 42.4 %
HDLC SERPL-MCNC: 89 MG/DL (ref 40–59)
HGB BLD-MCNC: 14.8 G/DL
IMM GRANULOCYTES # BLD AUTO: 0.02 X10(3) UL (ref 0–1)
IMM GRANULOCYTES NFR BLD: 0.3 %
LDLC SERPL CALC-MCNC: 114 MG/DL (ref ?–100)
LYMPHOCYTES # BLD AUTO: 1.99 X10(3) UL (ref 1–4)
LYMPHOCYTES NFR BLD AUTO: 33.1 %
MCH RBC QN AUTO: 34.4 PG (ref 26–34)
MCHC RBC AUTO-ENTMCNC: 34.9 G/DL (ref 31–37)
MCV RBC AUTO: 98.6 FL
MONOCYTES # BLD AUTO: 0.41 X10(3) UL (ref 0.1–1)
MONOCYTES NFR BLD AUTO: 6.8 %
NEUTROPHILS # BLD AUTO: 3.35 X10 (3) UL (ref 1.5–7.7)
NEUTROPHILS # BLD AUTO: 3.35 X10(3) UL (ref 1.5–7.7)
NEUTROPHILS NFR BLD AUTO: 55.7 %
NONHDLC SERPL-MCNC: 138 MG/DL (ref ?–130)
OSMOLALITY SERPL CALC.SUM OF ELEC: 293 MOSM/KG (ref 275–295)
PLATELET # BLD AUTO: 151 10(3)UL (ref 150–450)
POTASSIUM SERPL-SCNC: 4.3 MMOL/L (ref 3.5–5.1)
PROT SERPL-MCNC: 6.6 G/DL (ref 5.7–8.2)
RBC # BLD AUTO: 4.3 X10(6)UL
SODIUM SERPL-SCNC: 142 MMOL/L (ref 136–145)
TRIGL SERPL-MCNC: 139 MG/DL (ref 30–149)
TSI SER-ACNC: 2.68 MIU/ML (ref 0.55–4.78)
VIT D+METAB SERPL-MCNC: 27.8 NG/ML (ref 30–100)
VLDLC SERPL CALC-MCNC: 24 MG/DL (ref 0–30)
WBC # BLD AUTO: 6 X10(3) UL (ref 4–11)

## 2024-02-15 PROCEDURE — 80061 LIPID PANEL: CPT

## 2024-02-15 PROCEDURE — 36415 COLL VENOUS BLD VENIPUNCTURE: CPT

## 2024-02-15 PROCEDURE — 84443 ASSAY THYROID STIM HORMONE: CPT

## 2024-02-15 PROCEDURE — 82306 VITAMIN D 25 HYDROXY: CPT

## 2024-02-15 PROCEDURE — 85025 COMPLETE CBC W/AUTO DIFF WBC: CPT

## 2024-02-15 PROCEDURE — 80053 COMPREHEN METABOLIC PANEL: CPT

## 2024-02-15 PROCEDURE — 72100 X-RAY EXAM L-S SPINE 2/3 VWS: CPT | Performed by: PHYSICAL MEDICINE & REHABILITATION

## 2024-02-15 PROCEDURE — 99214 OFFICE O/P EST MOD 30 MIN: CPT | Performed by: PHYSICAL MEDICINE & REHABILITATION

## 2024-02-15 RX ORDER — GABAPENTIN 100 MG/1
100 CAPSULE ORAL NIGHTLY
Qty: 90 CAPSULE | Refills: 0 | Status: SHIPPED | OUTPATIENT
Start: 2024-02-15 | End: 2024-05-15

## 2024-02-15 NOTE — PROGRESS NOTES
[facility]  Progress Note    CHIEF COMPLAINT:    Chief Complaint   Patient presents with    Low Back Pain     09/13/22 LOV. Patient is here for R side low back pain. Pain radiates to R buttock, R hip and R posterior thigh. T/n in R posterior thigh. No burning. Pain 4/10. Pain worsens with exercise. No PT, imaging or injections. Ibuprofen prn. She had an old rx for gabapentin and was taking that nightly to help her sleep for about a week. She does not take that anymore.        History of Present Illness:  Eliana Jean is a 48 year old female who presents today for follow up for symptoms of right low back pain with radiation into the buttock and posterior thigh staying above the knee.  Symptoms are of insidious onset.  Symptoms worsen with exercise and lying in bed at night which causes her to awaken from sleep at times.  She had gabapentin at home from other symptoms in the past, tried it and it helps her sleep at night.    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    SOCIAL HISTORY:   Social History     Occupational History    Not on file   Tobacco Use    Smoking status: Never    Smokeless tobacco: Never   Substance and Sexual Activity    Alcohol use: Yes    Drug use: No    Sexual activity: Not on file       CURRENT MEDICATIONS:   Current Outpatient Medications   Medication Sig Dispense Refill    gabapentin 100 MG Oral Cap Take 1 capsule (100 mg total) by mouth nightly. 90 capsule 0    acetaZOLAMIDE  MG Oral Capsule SR 12 Hr       hyoscyamine ER (LEVBID) 0.375 MG Oral Tablet 12 Hr Take 1 tablet (0.375 mg total) by mouth every 12 (twelve) hours as needed for Cramping. 50 tablet 0    Melatonin 1 MG Oral Cap melatonin      clonazePAM 0.5 MG Oral Tab  Take 1 tablet (0.5 mg total) by mouth nightly as needed.      SLYND 4 MG Oral Tab Take 1 tablet by mouth daily.      sertraline 100 MG Oral Tab Take 1 tablet (100 mg total) by mouth daily.      B Complex Vitamins (B COMPLEX 1 OR) Take 1 tablet by mouth daily.      Cyanocobalamin (B-12) 100 MCG Oral Tab Take 1 tablet by mouth daily.         ALLERGIES:   Allergies   Allergen Reactions    No Known Allergies OTHER (SEE COMMENTS)           PHYSICAL EXAM:   Wt 138 lb (62.6 kg)   LMP 08/09/2023 (Approximate)   BMI 23.69 kg/m²     Body mass index is 23.69 kg/m².      General: No immediate distress  Extremities: No lower extremity edema bilaterally   Spine: full and painfree lumbar ROM in all directions  Hips: Tender to light palpation over the IT band, iliac crest and gluteus medius.  Neuro:   Cognition: alert & oriented x 3, attentive, able to follow 2 step commands, comprehention intact, spontaneous speech intact  Strength: Lower extremities have 5/5 strength, low-grade weakness of the gluteus medius bilaterally  Sensation: Normal lower extremities  Reflexes: Normal lower extremities  SLR: neg        Data    Radiology Imaging:  None for this condition      ASSESSMENT AND PLAN:  1. Lumbar radiculopathy  This may be an atypical presentation of low-grade radiculopathy versus soft tissue pain.  This gabapentin helps her sleep restfully, I recommended continuing 100 mg nightly until we can find a more definitive diagnosis.  I recommend a plain film x-ray as well as PT for flexion-based lumbar stabilization and treatment of the lateral crossed syndrome.  - XR LUMBAR SPINE (MIN 2 VIEWS) (CPT=72100); Future  - PHYSICAL THERAPY - INTERNAL    2. Iliotibial band syndrome of both sides  Will address in PT.  Discussed exercising in more than one plane, she loves to run.  - PHYSICAL THERAPY - INTERNAL        RTC for x-ray review      The patient was in agreement with the assessment and plan.  All questions were  answered.        Anand Hook MD  Physical Medicine and Rehabilitation/Sports Medicine  [facility]

## 2024-10-01 ENCOUNTER — OFFICE VISIT (OUTPATIENT)
Dept: FAMILY MEDICINE CLINIC | Facility: CLINIC | Age: 49
End: 2024-10-01
Payer: COMMERCIAL

## 2024-10-01 VITALS
OXYGEN SATURATION: 96 % | WEIGHT: 140 LBS | BODY MASS INDEX: 23.9 KG/M2 | DIASTOLIC BLOOD PRESSURE: 71 MMHG | SYSTOLIC BLOOD PRESSURE: 113 MMHG | HEIGHT: 64 IN | HEART RATE: 77 BPM

## 2024-10-01 DIAGNOSIS — E55.9 VITAMIN D DEFICIENCY: ICD-10-CM

## 2024-10-01 DIAGNOSIS — E78.2 MIXED HYPERLIPIDEMIA: ICD-10-CM

## 2024-10-01 DIAGNOSIS — R19.7 DIARRHEA, UNSPECIFIED TYPE: ICD-10-CM

## 2024-10-01 DIAGNOSIS — Z23 NEED FOR INFLUENZA VACCINATION: ICD-10-CM

## 2024-10-01 DIAGNOSIS — R15.9 FULL INCONTINENCE OF FECES: ICD-10-CM

## 2024-10-01 DIAGNOSIS — Z12.31 ENCOUNTER FOR SCREENING MAMMOGRAM FOR MALIGNANT NEOPLASM OF BREAST: ICD-10-CM

## 2024-10-01 DIAGNOSIS — Z00.00 ENCOUNTER FOR GENERAL ADULT MEDICAL EXAMINATION W/O ABNORMAL FINDINGS: Primary | ICD-10-CM

## 2024-10-01 PROCEDURE — 90656 IIV3 VACC NO PRSV 0.5 ML IM: CPT

## 2024-10-01 PROCEDURE — 99396 PREV VISIT EST AGE 40-64: CPT

## 2024-10-01 PROCEDURE — 90471 IMMUNIZATION ADMIN: CPT

## 2024-10-01 NOTE — PROGRESS NOTES
Subjective:   Eliana Jean is a 48 year old female who presents for Physical (Annual Physical would like flu shot ).     HPI   Patient is here for routine general physical exam.  No changes to chronic medical problems.   Patient is requesting blood testing. Diet and exercise have been fair.  Past medical history, family history, and social history were reviewed.    New concerns discussed during this visit:    Patient states that she is having fecal incontinence. She has history of diarrhea that was previously discussed with GI. She was advised to have colonoscopy done and take Loperamide daily. Patient has not done colonoscopy yet. She states that 2 times she had to change her clothes at work due to fecal incontinence. She is under stress due to her aging parents and having to manage her 4 kids    History/Other:      Chief Complaint Reviewed and Verified  Nursing Notes Reviewed and   Verified  Tobacco Reviewed  Allergies Reviewed  Medications Reviewed    Problem List Reviewed  Medical History Reviewed  Surgical History   Reviewed  OB Status Reviewed  Family History Reviewed  Social History   Reviewed           Tobacco:  She has never smoked tobacco.      Current Outpatient Medications   Medication Sig Dispense Refill    Norgestim-Eth Estrad Triphasic 0.18/0.215/0.25 MG-35 MCG Oral Tab Take 1 tablet by mouth daily.      Melatonin 1 MG Oral Cap melatonin      clonazePAM 0.5 MG Oral Tab Take 1 tablet (0.5 mg total) by mouth nightly as needed.      sertraline 100 MG Oral Tab Take 1 tablet (100 mg total) by mouth daily.      B Complex Vitamins (B COMPLEX 1 OR) Take 1 tablet by mouth daily.      Cyanocobalamin (B-12) 100 MCG Oral Tab Take 1 tablet by mouth daily.         Allergies   Allergen Reactions    No Known Allergies OTHER (SEE COMMENTS)       Depression Screening (PHQ-2/PHQ-9): Over the LAST 2 WEEKS   Little interest or pleasure in doing things: Not at all    Feeling down, depressed, or hopeless:  Not at all    PHQ-2 SCORE: 0           Review of Systems   Constitutional: Negative.  Negative for activity change and fatigue.   HENT: Negative.  Negative for congestion, ear pain, rhinorrhea and sneezing.    Eyes: Negative.  Negative for redness.   Respiratory: Negative.  Negative for cough, shortness of breath and wheezing.    Cardiovascular: Negative.  Negative for chest pain.   Gastrointestinal:  Positive for diarrhea. Negative for abdominal pain, constipation, nausea and vomiting.   Endocrine: Negative.    Genitourinary: Negative.  Negative for difficulty urinating and frequency.   Musculoskeletal: Negative.  Negative for back pain, joint swelling and myalgias.   Skin: Negative.  Negative for rash.   Allergic/Immunologic: Negative.    Neurological: Negative.  Negative for dizziness, syncope, light-headedness and headaches.   Hematological: Negative.    Psychiatric/Behavioral: Negative.           Objective:   /71 (BP Location: Left arm, Patient Position: Sitting, Cuff Size: adult)   Pulse 77   Ht 5' 4\" (1.626 m)   Wt 140 lb (63.5 kg)   LMP 09/24/2024 (Approximate)   SpO2 96%   BMI 24.03 kg/m²  Estimated body mass index is 24.03 kg/m² as calculated from the following:    Height as of this encounter: 5' 4\" (1.626 m).    Weight as of this encounter: 140 lb (63.5 kg).      Physical Exam  Vitals and nursing note reviewed.   Constitutional:       Appearance: Normal appearance. She is normal weight.   HENT:      Head: Normocephalic and atraumatic.      Right Ear: Tympanic membrane normal.      Left Ear: Tympanic membrane normal.      Nose: Nose normal.      Mouth/Throat:      Mouth: Mucous membranes are moist.      Pharynx: Oropharynx is clear.   Eyes:      Extraocular Movements: Extraocular movements intact.      Conjunctiva/sclera: Conjunctivae normal.      Pupils: Pupils are equal, round, and reactive to light.   Cardiovascular:      Rate and Rhythm: Normal rate and regular rhythm.      Pulses: Normal  pulses.      Heart sounds: Normal heart sounds.   Pulmonary:      Effort: Pulmonary effort is normal.      Breath sounds: Normal breath sounds.   Abdominal:      General: Abdomen is flat. Bowel sounds are normal.      Palpations: Abdomen is soft.   Musculoskeletal:         General: Normal range of motion.      Cervical back: Normal range of motion and neck supple.   Skin:     General: Skin is warm and dry.   Neurological:      General: No focal deficit present.      Mental Status: She is alert and oriented to person, place, and time. Mental status is at baseline.   Psychiatric:         Mood and Affect: Mood normal.         Behavior: Behavior normal.         Thought Content: Thought content normal.         Judgment: Judgment normal.           Assessment & Plan:     1. Encounter for general adult medical examination w/o abnormal findings  -Exam is unremarkable  -Screening tests/lab were discussed   -Will order blood works   -Healthy diet, exercise, and weight were discussed.  -Advised to call for any new problems and follow up for further management after testings have been done     2. Mixed hyperlipidemia  - Lipid Panel; Future  -Recommend low fat diet and low cholesterol.   -Avoid fried foods, cheese, processed foods.   -Recommend aerobic exercise at least 30mins five days a week.     3. Vitamin D deficiency  - Vitamin D; Future    4. Need for influenza vaccination  - Fluzone trivalent vaccine, PF 0.5mL, 6mo+ (15145)    5. Diarrhea, unspecified type  - Gastro Referral - In Network  -Advised to push fluids (water/g2)  -Brat diet(bananas, rice, applesauce, toast)  -No caffeine/alcohol  -Hand washing  -Take medication as directed and follow up with GI    6. Full incontinence of feces  - Gastro Referral - In Network    7. Encounter for screening mammogram for malignant neoplasm of breast  - [facility] KHANG 2D+3D SCREENING BILAT (CPT=77067/62655); Future         Medication use, effects and side effects discussed in detail with  patient. The patient indicated understanding of the diagnosis and agreed with the plan of care.    Return in about 3 months (around 1/1/2025).    KEVAN Rhoades

## 2025-01-03 ENCOUNTER — HOSPITAL ENCOUNTER (OUTPATIENT)
Dept: MAMMOGRAPHY | Age: 50
Discharge: HOME | End: 2025-01-03
Payer: COMMERCIAL

## 2025-01-03 DIAGNOSIS — Z12.31 ENCOUNTER FOR SCREENING MAMMOGRAM FOR MALIGNANT NEOPLASM OF BREAST: ICD-10-CM

## 2025-01-03 PROCEDURE — 77067 SCR MAMMO BI INCL CAD: CPT

## 2025-01-03 PROCEDURE — 77063 BREAST TOMOSYNTHESIS BI: CPT

## 2025-02-28 NOTE — H&P
[facility] - Gastroenterology                                                                                                               Requesting physician or provider: Sluly John MD    Chief Complaint   Patient presents with    Consult     Chronic diarrhea       HPI:   Eliana Jean is a 49 year old year-old female with history of depression. 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   Pt states for the past the past about 1 yr. Pt has been having loose to watery stools/diarrhea about 3-4 times a day when episodes occur. On a regular pt has loose stools 1-2 times a day. Additionally pt states that she has episodes of fecal incontinence about once every 2 weeks. Pt states symptoms are intermittent, describes them as episodes when they come on. Additional symptoms include generalized abdominal pain, burning, bloating, gas, nausea, loss of appetite, Pt states that when she eats at night she has diarrhea all night; reports that symptoms are not associated with food. Pt takes imodium for management but does not agree with the management plan with just imodium.  Concerned that it might be IBS-d  Pt had stool testing in 2023- negative  Celiac disease- negative 2023  Additionally, pt c/o of choking on food/food gets stuck in the throat. Which causes increased pain. Happens mostly with drier foods such as muffins or bread. Pt reports increased in take of water to get food down. Symptoms occur about once a month. Pt has a hx of acid reflux, takes nexium prn for symptoms.   Denies melena, hematochezia, hematemesis, abd surgery, changes in stool or bowel habits, N/V/D, weight gain/loss    Prior endoscopies:  denies    Soc:  -denies smoking  -occasional Etoh    Wt Readings from Last 6 Encounters:   03/04/25 140 lb (63.5 kg)   10/01/24 140 lb (63.5 kg)   02/15/24 138 lb (62.6 kg)   02/21/23 138 lb (62.6 kg)   09/13/22 138 lb (62.6 kg)   03/11/22  138 lb (62.6 kg)        History, Medications, Allergies, ROS:      Past Medical History:    Esophageal reflux    Optic disc hemorrhage, left    PVCs (premature ventricular contractions)    pt not taking medication      History reviewed. No pertinent surgical history.   Family Hx:   Family History   Problem Relation Age of Onset    Depression Mother     Glaucoma Mother     Other (Other) Mother         Rheumatoid arth    Diabetes Father         type 2    Depression Father     Bleeding Disorders Paternal Grandmother     Heart Disease Maternal Uncle         50s    Macular degeneration Neg     Breast Cancer Neg     Ovarian Cancer Neg     Pancreatic Cancer Neg     Prostate Cancer Neg     Colon Cancer Neg     Uterine Cancer Neg       Social History:   Social History     Socioeconomic History    Marital status:    Tobacco Use    Smoking status: Never    Smokeless tobacco: Never   Vaping Use    Vaping status: Never Used   Substance and Sexual Activity    Alcohol use: Yes    Drug use: No    Sexual activity: Yes     Partners: Male     Birth control/protection: Pill        Medications (Active prior to today's visit):  Current Outpatient Medications   Medication Sig Dispense Refill    Multiple Vitamin (ONE-DAILY MULTI VITAMINS) Oral Tab Take 1 tablet by mouth daily. 2000      Norgestim-Eth Estrad Triphasic 0.18/0.215/0.25 MG-35 MCG Oral Tab Take 1 tablet by mouth daily.      Melatonin 1 MG Oral Cap melatonin      clonazePAM 0.5 MG Oral Tab Take 1 tablet (0.5 mg total) by mouth nightly as needed.      sertraline 100 MG Oral Tab Take 1 tablet (100 mg total) by mouth daily.      B Complex Vitamins (B COMPLEX 1 OR) Take 1 tablet by mouth daily.      Cyanocobalamin (B-12) 100 MCG Oral Tab Take 1 tablet by mouth daily.         Allergies:  Allergies[1]    ROS:   CONSTITUTIONAL:  negative for fevers, rigors  EYES:  negative for diplopia   RESPIRATORY:  negative for severe shortness of breath  CARDIOVASCULAR:  negative for  crushing sub-sternal chest pain  GASTROINTESTINAL:  see HPI  GENITOURINARY:  negative for dysuria or gross hematuria  INTEGUMENT/BREAST:  SKIN:  negative for jaundice   ALLERGIC/IMMUNOLOGIC:  negative for hay fever  ENDOCRINE:  negative for cold intolerance and heat intolerance  MUSCULOSKELETAL:  negative for joint effusion/severe erythema  BEHAVIOR/PSYCH:  negative for psychotic behavior      PHYSICAL EXAM:   Blood pressure 93/59, pulse 67, height 5' 4\" (1.626 m), weight 140 lb (63.5 kg), last menstrual period 12/22/2024.    Gen- Patient appears comfortable and in no acute discomfort  HEENT: the sclera appears anicteric, oropharynx clear, mucus membranes appear moist  CV- regular rate and rhythm, the extremities are warm and well perfused   Lung- Moves air well; No labored breathing  Abdomen- soft, non-tender exam in all quadrants without rigidity or guarding, non-distended, no abnormal bowel sounds noted, no masses are palpated  Skin- No jaundice  Ext: no cyanosis, clubbing or edema is evident.   Neuro- Alert and interactive, and gross movements of extremities normal  Psych - appropriate, non-agitated    Labs/Imaging:     Patient's pertinent labs and imaging were reviewed and discussed with patient today.      ASSESSMENT/PLAN:   Eliana Jean is a 49 year old year-old female with history of depression. 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   Pt states for the past the past about 1 yr. Pt has been having loose to watery stools/diarrhea about 3-4 times a day when episodes occur. On a regular pt has loose stools 1-2 times a day. Additionally pt states that she has episodes of fecal incontinence about once every 2 weeks. Pt states symptoms are intermittent, describes them as episodes when they come on. Additional symptoms include generalized abdominal pain, burning, bloating, gas, nausea, loss of appetite, Pt states that when she eats at night she has diarrhea all night; reports that  symptoms are not associated with food. Pt takes imodium for management but does not agree with the management plan with just imodium.  Concerned that it might be IBS-d  Pt had stool testing in 2023- negative  Celiac disease- negative 2023  Additionally, pt c/o of choking on food/food gets stuck in the throat. Which causes increased pain. Happens mostly with drier foods such as muffins or bread. Pt reports increased in take of water to get food down. Symptoms occur about once a month. Pt has a hx of acid reflux, takes nexium prn for symptoms.   # consider diagnosis of IBS-D   - possible future trial of dicyclomine, rifaximin, ibguard  1. Incontinence of feces, unspecified fecal incontinence type  - Pelvic Floor Therapy - [place] Location    2. Diarrhea, unspecified type  - Pelvic Floor Therapy - [place] Location    3. Dysphagia, unspecified type    4. Gastroesophageal reflux disease without esophagitis    5. Generalized abdominal pain    6. Nausea     # fecal incontinence  - pelvic floor therapy    # diarrhea  - continue imodium as needed  - egd/colonoscopy recommended    # dysphagia/acid reflux  - small bites encouraged  - avoid triggering foods  - egd recommended    1. Schedule colonoscopy/egd with MAC w/ URGENT pool MD [Diagnosis: diarrhea, lower abdominal pain, nausea],    2.  bowel prep from pharmacy: Prep: Trilyte Prep      For cardiology patients and patients on blood thinners:  Please contact your cardiology clinic for clearance to proceed with the endoscopic procedure. If you are on blood thinners, please also confirm with your cardiologic clinic that you are able to hold the blood thinner per our recommendations.\"    BLOOD THINNER ORDERS:  -Hold for 48 hours (Xarelto, Eliquis, Pradaxa, Savaysa)  -Hold for 3 days (Pletal)  -Hold for 5 days (Coumadin, Plavix, Brilinta, Aggrenox)  -Hold for 7 days (Effient)     For endocrinology insulin patients:    Please contact your endocrinology clinic for insulin  adjustment orders prior to your endoscopic procedure.    4. Read all bowel prep instructions carefully    5. AVOID seeds, nuts, popcorn, raw fruits and vegetables (cooked is okay) for 2-3 days before procedure    6.   If you start any NEW medication after your visit today, please notify us. Certain medications will need to be held before the procedure, or the procedure cannot be performed.     >>>Please note: if you were prescribed Suprep for the bowel prep and it is too expensive or not covered by insurance, it is okay to substitute Trilyte (or any similar generic prep). This can be done by notifying the pharmacy or calling our office.     ENDOSCOPIC RISK BENEFIT DISCUSSION: I described the procedure in great detail with the patient. I discussed the risks and benefits, including but not limited to: bleeding, perforation, infection, anesthesia complications, and even death. Patient will be NPO after midnight and will have a person physically present at time of pick-up to drive patient home. Patient verbalized understanding and agrees to proceed with procedure as planned.           Orders This Visit:  No orders of the defined types were placed in this encounter.      Meds This Visit:  Requested Prescriptions     Pending Prescriptions Disp Refills    PEG 3350-KCl-Na Bicarb-NaCl (TRILYTE) 420 g Oral Recon Soln 4000 mL 0     Sig: Take prep as directed by gastro office. May substitute with Trilyte/generic equivalent if needed.       Imaging & Referrals:  CT ABDOMEN+PELVIS(CONTRAST ONLY)(CPT=74177)         KEVAN Strong   3/4/2025          [1]   Allergies  Allergen Reactions    No Known Allergies OTHER (SEE COMMENTS)

## 2025-03-04 ENCOUNTER — OFFICE VISIT (OUTPATIENT)
Facility: CLINIC | Age: 50
End: 2025-03-04

## 2025-03-04 ENCOUNTER — TELEPHONE (OUTPATIENT)
Facility: CLINIC | Age: 50
End: 2025-03-04

## 2025-03-04 VITALS
HEART RATE: 67 BPM | DIASTOLIC BLOOD PRESSURE: 59 MMHG | WEIGHT: 140 LBS | BODY MASS INDEX: 23.9 KG/M2 | SYSTOLIC BLOOD PRESSURE: 93 MMHG | HEIGHT: 64 IN

## 2025-03-04 DIAGNOSIS — R11.0 NAUSEA: ICD-10-CM

## 2025-03-04 DIAGNOSIS — R10.84 GENERALIZED ABDOMINAL PAIN: ICD-10-CM

## 2025-03-04 DIAGNOSIS — R13.10 DYSPHAGIA, UNSPECIFIED TYPE: ICD-10-CM

## 2025-03-04 DIAGNOSIS — R19.7 DIARRHEA, UNSPECIFIED TYPE: Primary | ICD-10-CM

## 2025-03-04 DIAGNOSIS — R19.7 DIARRHEA, UNSPECIFIED TYPE: ICD-10-CM

## 2025-03-04 DIAGNOSIS — R15.9 INCONTINENCE OF FECES, UNSPECIFIED FECAL INCONTINENCE TYPE: Primary | ICD-10-CM

## 2025-03-04 DIAGNOSIS — K21.9 GASTROESOPHAGEAL REFLUX DISEASE WITHOUT ESOPHAGITIS: ICD-10-CM

## 2025-03-04 PROCEDURE — 99214 OFFICE O/P EST MOD 30 MIN: CPT

## 2025-03-04 RX ORDER — MULTIVITAMIN
1 TABLET ORAL DAILY
COMMUNITY

## 2025-03-04 RX ORDER — POLYETHYLENE GLYCOL 3350, SODIUM CHLORIDE, SODIUM BICARBONATE, POTASSIUM CHLORIDE 420; 11.2; 5.72; 1.48 G/4L; G/4L; G/4L; G/4L
POWDER, FOR SOLUTION ORAL
Qty: 4000 ML | Refills: 0 | Status: SHIPPED | OUTPATIENT
Start: 2025-03-04

## 2025-03-04 NOTE — TELEPHONE ENCOUNTER
Patient scheduled for Colonoscopy 61233 / EGD 95196 on 3/19/2025. Routed message to Prior Auth Team.

## 2025-03-04 NOTE — PATIENT INSTRUCTIONS
# fecal incontinence  - pelvic floor therapy    # diarrhea  - continue imodium as needed  - egd/colonoscopy recommended    # dysphagia/acid reflux  - small bites encouraged  - avoid triggering foods  - egd recommended    1. Schedule colonoscopy/egd with MAC w/ URGENT pool MD [Diagnosis: diarrhea, lower abdominal pain, nausea],    2.  bowel prep from pharmacy: Prep: Trilyte Prep      For cardiology patients and patients on blood thinners:  Please contact your cardiology clinic for clearance to proceed with the endoscopic procedure. If you are on blood thinners, please also confirm with your cardiologic clinic that you are able to hold the blood thinner per our recommendations.\"    BLOOD THINNER ORDERS:  -Hold for 48 hours (Xarelto, Eliquis, Pradaxa, Savaysa)  -Hold for 3 days (Pletal)  -Hold for 5 days (Coumadin, Plavix, Brilinta, Aggrenox)  -Hold for 7 days (Effient)     For endocrinology insulin patients:    Please contact your endocrinology clinic for insulin adjustment orders prior to your endoscopic procedure.    4. Read all bowel prep instructions carefully    5. AVOID seeds, nuts, popcorn, raw fruits and vegetables (cooked is okay) for 2-3 days before procedure    6.   If you start any NEW medication after your visit today, please notify us. Certain medications will need to be held before the procedure, or the procedure cannot be performed.     >>>Please note: if you were prescribed Suprep for the bowel prep and it is too expensive or not covered by insurance, it is okay to substitute Trilyte (or any similar generic prep). This can be done by notifying the pharmacy or calling our office.     ENDOSCOPIC RISK BENEFIT DISCUSSION: I described the procedure in great detail with the patient. I discussed the risks and benefits, including but not limited to: bleeding, perforation, infection, anesthesia complications, and even death. Patient will be NPO after midnight and will have a person physically present at  time of pick-up to drive patient home. Patient verbalized understanding and agrees to proceed with procedure as planned.

## 2025-03-04 NOTE — TELEPHONE ENCOUNTER
URGENT Pool Endoscopist     Scheduled for:  Colonoscopy 57696 / EGD [id number]  Location:  [facility] ([facility])  [id number]  Provider: Tonya Hinkle MD    Date:  3/19/2025  Wednesday   Time:   8:30 am  (Patient made aware will receive phone call the day before with an arrival time)    Sedation:  MAC  Prep:  Split TriLyte    Diagnosis with codes:    ICD-10-CM   1. Diarrhea, unspecified type  R19.7   2. Generalized abdominal pain  R10.84   3. Nausea  R11.0   4. Dysphagia, unspecified type  R13.10   5. Gastroesophageal reflux disease without esophagitis  K21.9        Meds/Allergies Reconciled?:   Provider Reviewed   Was patient informed to call insurance with codes (Y/N):  Yes  Referral sent?: Referral was sent at the time of electronic surgical scheduling.  [facility] or [facility] notified?: I sent an electronic request to ENDO Scheduling and received a confirmation today.     Medication Orders:  Patient is aware to NOT take iron pills, herbal meds and diet supplements for 7 days before exam. Also to NOT take any form of alcohol, recreational drugs and any forms of ED meds 24 hours before exam.         Misc Orders:  N/A   Further instructions given by staff:  I Provided prep instructions to patient and reviewed date, time and location. Patient verbalized that  She / Her understood and is aware to call with any questions.  Patient was informed about the new cancellation policy for She / Her procedure. Patient was also given copy of the cancellation policy at the time of the appointment and verbalized understanding.

## 2025-03-19 ENCOUNTER — ANESTHESIA EVENT (OUTPATIENT)
Dept: ENDOSCOPY | Facility: HOSPITAL | Age: 50
End: 2025-03-19
Payer: COMMERCIAL

## 2025-03-19 ENCOUNTER — HOSPITAL ENCOUNTER (OUTPATIENT)
Facility: HOSPITAL | Age: 50
Setting detail: HOSPITAL OUTPATIENT SURGERY
Discharge: HOME | End: 2025-03-19
Attending: INTERNAL MEDICINE | Admitting: INTERNAL MEDICINE
Payer: COMMERCIAL

## 2025-03-19 ENCOUNTER — ANESTHESIA (OUTPATIENT)
Dept: ENDOSCOPY | Facility: HOSPITAL | Age: 50
End: 2025-03-19
Payer: COMMERCIAL

## 2025-03-19 ENCOUNTER — TELEPHONE (OUTPATIENT)
Dept: GASTROENTEROLOGY | Facility: CLINIC | Age: 50
End: 2025-03-19

## 2025-03-19 VITALS
HEIGHT: 64 IN | WEIGHT: 140 LBS | RESPIRATION RATE: 16 BRPM | DIASTOLIC BLOOD PRESSURE: 58 MMHG | HEART RATE: 64 BPM | BODY MASS INDEX: 23.9 KG/M2 | SYSTOLIC BLOOD PRESSURE: 107 MMHG | OXYGEN SATURATION: 100 %

## 2025-03-19 DIAGNOSIS — R11.0 NAUSEA: ICD-10-CM

## 2025-03-19 DIAGNOSIS — R13.10 DYSPHAGIA, UNSPECIFIED TYPE: ICD-10-CM

## 2025-03-19 DIAGNOSIS — R10.84 GENERALIZED ABDOMINAL PAIN: ICD-10-CM

## 2025-03-19 DIAGNOSIS — K21.9 GASTROESOPHAGEAL REFLUX DISEASE WITHOUT ESOPHAGITIS: ICD-10-CM

## 2025-03-19 DIAGNOSIS — R19.7 DIARRHEA, UNSPECIFIED TYPE: ICD-10-CM

## 2025-03-19 PROBLEM — K63.5 POLYP OF ASCENDING COLON: Status: ACTIVE | Noted: 2025-03-19

## 2025-03-19 PROBLEM — K29.70 GASTRITIS WITHOUT BLEEDING: Status: ACTIVE | Noted: 2025-03-19

## 2025-03-19 LAB — B-HCG UR QL: NEGATIVE

## 2025-03-19 PROCEDURE — 3E0H8GC INTRODUCTION OF OTHER THERAPEUTIC SUBSTANCE INTO LOWER GI, VIA NATURAL OR ARTIFICIAL OPENING ENDOSCOPIC: ICD-10-PCS | Performed by: INTERNAL MEDICINE

## 2025-03-19 PROCEDURE — 43239 EGD BIOPSY SINGLE/MULTIPLE: CPT | Performed by: INTERNAL MEDICINE

## 2025-03-19 PROCEDURE — 45380 COLONOSCOPY AND BIOPSY: CPT | Performed by: INTERNAL MEDICINE

## 2025-03-19 PROCEDURE — 0DB78ZX EXCISION OF STOMACH, PYLORUS, VIA NATURAL OR ARTIFICIAL OPENING ENDOSCOPIC, DIAGNOSTIC: ICD-10-PCS | Performed by: INTERNAL MEDICINE

## 2025-03-19 PROCEDURE — 0DBE8ZX EXCISION OF LARGE INTESTINE, VIA NATURAL OR ARTIFICIAL OPENING ENDOSCOPIC, DIAGNOSTIC: ICD-10-PCS | Performed by: INTERNAL MEDICINE

## 2025-03-19 PROCEDURE — 45381 COLONOSCOPY SUBMUCOUS NJX: CPT | Performed by: INTERNAL MEDICINE

## 2025-03-19 PROCEDURE — 0DB98ZX EXCISION OF DUODENUM, VIA NATURAL OR ARTIFICIAL OPENING ENDOSCOPIC, DIAGNOSTIC: ICD-10-PCS | Performed by: INTERNAL MEDICINE

## 2025-03-19 PROCEDURE — 0DBB8ZX EXCISION OF ILEUM, VIA NATURAL OR ARTIFICIAL OPENING ENDOSCOPIC, DIAGNOSTIC: ICD-10-PCS | Performed by: INTERNAL MEDICINE

## 2025-03-19 PROCEDURE — 0DB18ZX EXCISION OF UPPER ESOPHAGUS, VIA NATURAL OR ARTIFICIAL OPENING ENDOSCOPIC, DIAGNOSTIC: ICD-10-PCS | Performed by: INTERNAL MEDICINE

## 2025-03-19 PROCEDURE — 0DB38ZX EXCISION OF LOWER ESOPHAGUS, VIA NATURAL OR ARTIFICIAL OPENING ENDOSCOPIC, DIAGNOSTIC: ICD-10-PCS | Performed by: INTERNAL MEDICINE

## 2025-03-19 RX ORDER — SODIUM CHLORIDE, SODIUM LACTATE, POTASSIUM CHLORIDE, CALCIUM CHLORIDE 600; 310; 30; 20 MG/100ML; MG/100ML; MG/100ML; MG/100ML
INJECTION, SOLUTION INTRAVENOUS CONTINUOUS
Status: DISCONTINUED | OUTPATIENT
Start: 2025-03-19 | End: 2025-03-20

## 2025-03-19 RX ORDER — LIDOCAINE HYDROCHLORIDE 10 MG/ML
INJECTION, SOLUTION EPIDURAL; INFILTRATION; INTRACAUDAL; PERINEURAL AS NEEDED
Status: DISCONTINUED | OUTPATIENT
Start: 2025-03-19 | End: 2025-03-19 | Stop reason: SURG

## 2025-03-19 RX ADMIN — SODIUM CHLORIDE, SODIUM LACTATE, POTASSIUM CHLORIDE, CALCIUM CHLORIDE: 600; 310; 30; 20 INJECTION, SOLUTION INTRAVENOUS at 08:26:00

## 2025-03-19 RX ADMIN — LIDOCAINE HYDROCHLORIDE 5 ML: 10 INJECTION, SOLUTION EPIDURAL; INFILTRATION; INTRACAUDAL; PERINEURAL at 08:31:00

## 2025-03-19 RX ADMIN — SODIUM CHLORIDE, SODIUM LACTATE, POTASSIUM CHLORIDE, CALCIUM CHLORIDE: 600; 310; 30; 20 INJECTION, SOLUTION INTRAVENOUS at 09:31:00

## 2025-03-19 NOTE — DISCHARGE INSTRUCTIONS
Home Care Instructions for Colonoscopy and/or Gastroscopy with Sedation    Diet:  - Resume your regular diet as tolerated unless otherwise instructed.  - Start with light meals to minimize bloating.  - Do not drink alcohol today.    Medication:  - If you have questions about resuming your normal medications, please contact your Primary Care Physician.    Activities:  - Take it easy today. Do not return to work today.  - Do not drive today.  - Do not operate any machinery today (including kitchen equipment).  -     Don't sign any legal documents or make critical decisions.    Colonoscopy:  - You may notice some rectal \"spotting\" (a little blood on the toilet tissue) for a day or two after the exam. This is normal.  - If you experience any rectal bleeding (not spotting), persistent tenderness or sharp severe abdominal pains, oral temperature over 100 degrees Fahrenheit, light-headedness or dizziness, or any other problems, contact your doctor.    Gastroscopy:  - You may have a sore throat for 2-3 days following the exam. This is normal. Gargling with warm salt water (1/2 tsp salt to 1 glass warm water) or using throat lozenges will help.  - If you experience any sharp pain in your neck, abdomen or chest, vomiting of blood, oral temperature over 100 degrees Fahrenheit, light-headedness or dizziness, or any other problems, contact your doctor.    **If unable to reach your doctor, please go to the [facility] Emergency Room**    - Your referring physician will receive a full report of your examination.  - If you do not hear from your doctor's office within two weeks of your biopsy, please call them for your results.

## 2025-03-19 NOTE — TELEPHONE ENCOUNTER
GI staff - please set up follow up with me in GI clinic next week to discuss path results and next steps. OK to use a res-24 slot or overbook. Virtual is OK as well. Thanks, SS

## 2025-03-19 NOTE — ANESTHESIA POSTPROCEDURE EVALUATION
Patient: Eliana Jean    Procedure Summary       Date: 03/19/25 Room / Location: [facility] ENDOSCOPY 04 / [facility] ENDOSCOPY    Anesthesia Start: 0826 Anesthesia Stop: 0940    Procedures:       COLONOSCOPY      ESOPHAGOGASTRODUODENOSCOPY (EGD) Diagnosis:       Diarrhea, unspecified type      Generalized abdominal pain      Nausea      Dysphagia, unspecified type      Gastroesophageal reflux disease without esophagitis      (mild gastritis, colon polyp,hemorrhoids)    Surgeons: Tonya Hinkle MD Anesthesiologist: Romulo Dean CRNA    Anesthesia Type: MAC ASA Status: 2            Anesthesia Type: MAC    Vitals Value Taken Time   /70 03/19/25 0940   Temp - 03/19/25 0945   Pulse 63 03/19/25 0944   Resp 22 03/19/25 0944   SpO2 98 % 03/19/25 0944   Vitals shown include unfiled device data.    [facility] AN Post Evaluation:   Patient Participation: complete - patient participated  Level of Consciousness: awake and alert and awake  Pain Score: 0  Pain Management: adequate  Airway Patency:patent  Dental exam unchanged from preop  Yes    Nausea/Vomiting: none  Cardiovascular Status: acceptable and blood pressure returned to baseline  Respiratory Status: room air  Postoperative Hydration acceptable      Romulo Dean CRNA  3/19/2025 9:45 AM

## 2025-03-19 NOTE — ANESTHESIA PREPROCEDURE EVALUATION
Anesthesia PreOp Note    HPI:     Eliana Jean is a 49 year old female who presents for preoperative consultation requested by: Tonya Hinkle MD    Date of Surgery: 3/19/2025    Procedure(s):  COLONOSCOPY  ESOPHAGOGASTRODUODENOSCOPY (EGD)  Indication: Diarrhea, unspecified type/ Generalized abdominal pain / Nausea/ Dysphagia, unspecified type/ Gastroesophageal reflux disease without esophagitis    Relevant Problems   No relevant active problems       NPO:  Last Liquid Consumption Date: 03/19/25  Last Liquid Consumption Time: 0300  Last Solid Consumption Date: 03/17/25  Last Solid Consumption Time: 1000  Last Liquid Consumption Date: 03/19/25          History Review:  Patient Active Problem List    Diagnosis Date Noted    Optic disc hemorrhage, 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 No pertinent surgical history.    Prescriptions Prior to Admission[1]  Current Medications and Prescriptions Ordered in Epic[2]    Allergies[3]    Family History   Problem Relation Age of Onset    Depression Mother     Glaucoma Mother     Other (Other) Mother         Rheumatoid arth    Diabetes Father         type 2    Depression Father     Bleeding Disorders Paternal Grandmother     Heart Disease Maternal Uncle         50s    Macular degeneration Neg     Breast Cancer Neg     Ovarian Cancer Neg     Pancreatic Cancer Neg     Prostate Cancer Neg     Colon Cancer Neg     Uterine Cancer Neg      Social History     Socioeconomic History    Marital status:    Tobacco Use    Smoking status: Never    Smokeless tobacco: Never   Vaping Use    Vaping status: Never Used   Substance and Sexual Activity    Alcohol use: Yes     Comment: SOCIALLY    Drug use:  No    Sexual activity: Yes     Partners: Male     Birth control/protection: Pill       Available pre-op labs reviewed.             Vital Signs:  Body mass index is 24.03 kg/m².   height is 1.626 m (5' 4\") and weight is 63.5 kg (140 lb).   Vitals:    03/12/25 1239   Weight: 63.5 kg (140 lb)   Height: 1.626 m (5' 4\")        Anesthesia Evaluation     Patient summary reviewed and Nursing notes reviewed    No history of anesthetic complications   Airway   Mallampati: II  TM distance: >3 FB  Neck ROM: full  Dental - Dentition appears grossly intact     Pulmonary - negative ROS and normal exam   Cardiovascular - negative ROS and normal exam  Exercise tolerance: good    Neuro/Psych - negative ROS     GI/Hepatic/Renal    (+) GERD    Endo/Other - negative ROS   Abdominal  - normal exam                 Anesthesia Plan:   ASA:  2  Plan:   MAC  Informed Consent Plan and Risks Discussed With:  Patient  Discussed plan with:  CRNA      I have informed Eliana Jean and/or legal guardian or family member of the nature of the anesthetic plan, benefits, risks including possible dental damage if relevant, major complications, and any alternative forms of anesthetic management.   All of the patient's questions were answered to the best of my ability. The patient desires the anesthetic management as planned.  Romulo Dean CRNA  3/19/2025 7:49 AM  Present on Admission:  **None**           [1]   Medications Prior to Admission   Medication Sig Dispense Refill Last Dose/Taking    Norgestim-Eth Estrad Triphasic 0.18/0.215/0.25 MG-35 MCG Oral Tab Take 1 tablet by mouth daily.   Taking    Melatonin 1 MG Oral Cap melatonin   Taking    clonazePAM 0.5 MG Oral Tab Take 1 tablet (0.5 mg total) by mouth nightly as needed.   Taking As Needed    sertraline 100 MG Oral Tab Take 1 tablet (100 mg total) by mouth daily.   Taking    B Complex Vitamins (B COMPLEX 1 OR) Take 1 tablet by mouth daily.   Taking    Cyanocobalamin (B-12) 100 MCG Oral Tab Take 1  tablet by mouth daily.   Taking    PEG 3350-KCl-Na Bicarb-NaCl (TRILYTE) 420 g Oral Recon Soln Take prep as directed by gastro office. May substitute with Trilyte/generic equivalent if needed. (Patient not taking: Reported on 3/12/2025) 4000 mL 0 Not Taking   [2]   Current Facility-Administered Medications Ordered in Epic   Medication Dose Route Frequency Provider Last Rate Last Admin    lactated ringers infusion   Intravenous Continuous Tonya Hinkle MD         No current [facility]-ordered outpatient medications on file.   [3]   Allergies  Allergen Reactions    No Known Allergies OTHER (SEE COMMENTS)

## 2025-03-19 NOTE — PRE-SEDATION ASSESSMENT
Physician Pre-Sedation Assessment    Pre-Sedation Assessment:    Sedation History: Previous Sedation with No Complications and Airway Assessed    Cardiac: normal S1, S2  Respiratory: breath sounds clear bilaterally   Abdomen: soft, BS (+), non-tender    ASA Classification: 2. Patient with mild systemic disease    Plan: IV Sedation      [Non-Contributory] : Non-contributory

## 2025-03-19 NOTE — H&P
History & Physical Examination    Patient Name: Eliana Jean  MRN: [id number]  [facility]: [id number]  YOB: 1975    Diagnosis: esophageal dysphagia, diarrhea, fecal incontinence     Prescriptions Prior to Admission[1]  No current facility-administered medications for this encounter.       Allergies: Allergies[2]    Past Medical History:    Esophageal reflux    Optic disc hemorrhage, left    PVCs (premature ventricular contractions)    pt not taking medication    Visual impairment    CONTACTS/GLASSES     History reviewed. No pertinent surgical history.  Family History   Problem Relation Age of Onset    Depression Mother     Glaucoma Mother     Other (Other) Mother         Rheumatoid arth    Diabetes Father         type 2    Depression Father     Bleeding Disorders Paternal Grandmother     Heart Disease Maternal Uncle         50s    Macular degeneration Neg     Breast Cancer Neg     Ovarian Cancer Neg     Pancreatic Cancer Neg     Prostate Cancer Neg     Colon Cancer Neg     Uterine Cancer Neg      Social History     Tobacco Use    Smoking status: Never    Smokeless tobacco: Never   Substance Use Topics    Alcohol use: Yes     Comment: SOCIALLY         SYSTEM Check if Review is Normal Check if Physical Exam is Normal If not normal, please explain:   HEENT [X ] [ X]    NECK  [X ] [ X]    HEART [X ] [ X]    LUNGS [X ] [ X]    ABDOMEN [X ] [ X]    EXTREMITIES [X ] [ X]    OTHER        I have discussed the risks and benefits and alternatives of the procedure with the patient/family.  They understand and agree to proceed with plan of care.   I have reviewed the History and Physical done within the last 30 days.  Any changes noted above.    Tonya Hinkle MD  [facility] - Gastroenterology  3/19/2025               [1]   Medications Prior to Admission   Medication Sig Dispense Refill Last Dose/Taking    Norgestim-Eth Estrad Triphasic 0.18/0.215/0.25 MG-35 MCG Oral Tab Take 1 tablet by mouth daily.   Taking     Melatonin 1 MG Oral Cap melatonin   Taking    clonazePAM 0.5 MG Oral Tab Take 1 tablet (0.5 mg total) by mouth nightly as needed.   Taking As Needed    sertraline 100 MG Oral Tab Take 1 tablet (100 mg total) by mouth daily.   Taking    B Complex Vitamins (B COMPLEX 1 OR) Take 1 tablet by mouth daily.   Taking    Cyanocobalamin (B-12) 100 MCG Oral Tab Take 1 tablet by mouth daily.   Taking    PEG 3350-KCl-Na Bicarb-NaCl (TRILYTE) 420 g Oral Recon Soln Take prep as directed by gastro office. May substitute with Trilyte/generic equivalent if needed. (Patient not taking: Reported on 3/12/2025) 4000 mL 0 Not Taking   [2]   Allergies  Allergen Reactions    No Known Allergies OTHER (SEE COMMENTS)

## 2025-03-19 NOTE — OPERATIVE REPORT
ESOPHAGOGASTRODUODENOSCOPY (EGD) & COLONOSCOPY REPORT    Eliana Jean     1975 Age 49 year old   PCP Sully John MD Endoscopist Tonya Hinkle MD     Date of procedure: 25    Procedure: EGD w/ cold biopsy & Colonoscopy w/ submucosal injection and cold biopsy    Pre-operative diagnosis: esophageal dysphagia, diarrhea, fecal incontinence     Post-operative diagnosis: gastritis, large ascending colon polyp not removed, small internal hemorrhoids    Medications: MAC    Withdrawal time: 34 minutes    Complications: none    Procedure: Informed consent was obtained from the patient after the risks of the procedure were discussed, including but not limited to bleeding, perforation, aspiration, infection, or possibility of a missed lesion. We discussed the risks/benefits and alternatives to this procedure, as well as the planned sedation. EGD procedure: The patient was placed in the left lateral decubitus position and begun on continuous blood pressure pulse oximetry and EKG monitoring and this was maintained throughout the procedure. Once an adequate level of sedation was obtained a bite block was placed. Then the lubricated tip of the [id number] diagnostic video upper endoscope was inserted and advanced using direct visualization into the posterior pharynx and ultimately into the esophagus. Colonoscopy procedure: Once an adequate level of sedation was obtained a digital rectal exam was completed. Then the lubricated tip of the [id number] diagnostic video colonoscope was inserted and advanced without difficulty to the cecum using the CO2 insufflation technique. The cecum was identified by localizing the trifold, the appendix and the ileocecal valve. A routine second examination of the cecum/ascending colon was performed. Withdrawal was begun with thorough washing and careful examination of the colonic walls and folds. Photodocumentation was obtained. The bowel prep was good. Views of  the colon were good with washing. I then carefully withdrew the instrument from the patient who tolerated the procedure well.     Complications: None    EGD findings:      1. Esophagus: The squamocolumnar junction was noted at 41 cm and appeared unremarkable. The diaphragmatic pinch was noted noted at 41 cm from the incisors. The esophageal mucosa appeared unremarkable and cold forceps biopsies were taken of the proximal and distal esophagus.   2. Stomach: The stomach distended normally. Normal rugal folds were seen. The pylorus was patent. The gastric mucosa appeared mildly erythematous diffusely with scattered antral erosions and cold forceps biopsies were taken of the antrum, incisura, and body. Retroflexion revealed a normal fundus and a non-patulous cardia.   3. Duodenum: The duodenal mucosa appeared normal in the 1st and 2nd portion of the duodenum. Cold forceps biopsies were taken of the bulb and descending duodenum.     Colonoscopy findings:    1. 1 polyp noted as follows:      A. 25-30 mm polyp in the ascending colon by a turn overlying three folds; carpet-like morphology; the polyp was not removed as it will require either endoscopic mucosal resection or surgical resection given the size.  The polyp was biopsied at the edge. The mucosa adjacent to the polyp was successfully injected 0.5 ml of SPOT dye.   2. Diverticulosis: none.  3. Terminal ileum: the visualized mucosa appeared normal. Cold forceps biopsies were taken.  4. A retroflexed view of the rectum revealed small internal hemorrhoids.  5. The colonic mucosa throughout the colon showed normal vascular pattern, without evidence of angioectasias or inflammation. Random colon biopsies were taken.    6. PATRICE: normal rectal tone, no masses palpated.     Impression:  Mild gastritis with scattered antral erosions.  Otherwise unremarkable EGD s/p biopsies of the esophagus, stomach, and duodenum.  One large ascending colon polyp. Not removed as it will  require either EMR by interventional GI or surgical resection.    Small internal hemorrhoids.   Otherwise unremarkable ileocolonoscopy s/p biopsies of the ileum and colon.     Recommend:  Await pathology.   Follow up with Dr. Hinkle in 1-2 weeks to discuss next steps.    High fiber diet.  Monitor for blood in the stool. If having more than just tinge of blood, call office or go to the ER.    >>>If tissue was obtained and you have not received your pathology results either by phone or letter within 2 weeks, please call our office at [phone].    Specimens: duodenum, gastric, esophagus, ileum, colon, ascending colon polyp biopsy     Blood loss: <1 ml

## 2025-04-08 ENCOUNTER — TELEPHONE (OUTPATIENT)
Facility: CLINIC | Age: 50
End: 2025-04-08

## 2025-04-08 NOTE — TELEPHONE ENCOUNTER
Patient says she spoke with her insurance and the procedure for Esophagogastroduodenoscopy  she had on 3/19/25 was denied. Patient asking if prior authorization was done or if our office can appeal. Please update patient at [phone],thanks.

## 2025-04-09 NOTE — TELEPHONE ENCOUNTER
Dr. Hinkle    Would you be willing to call number provided in message below to do a peer to peer?  Patient had EGD with you on 3/19/2025 and just notified of the denial.    Thank you

## 2025-04-09 NOTE — TELEPHONE ENCOUNTER
Must contact [employer] directly at [phone], option 1, for reconsideration. Message sent to GI team.

## 2025-04-09 NOTE — TELEPHONE ENCOUNTER
PPD Team    Do you have the full denial details with how to do peer to peer?  I only see the reason for denial but no further information required to work on this.    Thank you

## 2025-04-09 NOTE — TELEPHONE ENCOUNTER
Dr. Hinkle    Patient called for 1 week follow up appointment post scope.  I offered her appointment tomorrow, but she works and requesting Tuesday appointment.  Is there a time/date I can overbook on a Tuesday to get her in sooner than the first open res24 on a Tuesday which is 4/29/25?    Thank you

## 2025-04-10 NOTE — TELEPHONE ENCOUNTER
GI Clinical Staff/Dr. Hinkle     See EGD approval/details from referral #[id number] below.     Per [employer] case appeared as a duplicate which prompted automated denial and P2P option.    Referral updated by [employer] with [id number] approval valid 3/4/25-9/1/25.     No further action needed.    Thank you!

## 2025-04-14 NOTE — TELEPHONE ENCOUNTER
I spoke to the patient    RN informed patient that Dr. Hinkle is out of the office until 4/21/2025 and her next available appointment is on 4/24/2025 (patient declined)    Patient scheduled for office visit on 4/29/2025 at 10 am    Location, date and time verified with the patient    Patient verbalized understanding and has no further questions at this time    Future Appts 4/14/2025 - 7/13/2025        Status Date Visit Type Length Department Provider     [facility] 4/29/2025 10:00 AM FOLLOW UP VISIT 30 min [facility] Tonya Hinkle MD

## 2025-04-29 ENCOUNTER — OFFICE VISIT (OUTPATIENT)
Dept: GASTROENTEROLOGY | Facility: CLINIC | Age: 50
End: 2025-04-29
Payer: COMMERCIAL

## 2025-04-29 ENCOUNTER — TELEPHONE (OUTPATIENT)
Dept: GASTROENTEROLOGY | Facility: CLINIC | Age: 50
End: 2025-04-29

## 2025-04-29 VITALS
WEIGHT: 138 LBS | BODY MASS INDEX: 23.56 KG/M2 | HEART RATE: 66 BPM | DIASTOLIC BLOOD PRESSURE: 62 MMHG | HEIGHT: 64 IN | SYSTOLIC BLOOD PRESSURE: 99 MMHG

## 2025-04-29 DIAGNOSIS — K52.832 LYMPHOCYTIC COLITIS: ICD-10-CM

## 2025-04-29 DIAGNOSIS — K21.00 GASTROESOPHAGEAL REFLUX DISEASE WITH ESOPHAGITIS WITHOUT HEMORRHAGE: ICD-10-CM

## 2025-04-29 DIAGNOSIS — D12.2 ADENOMATOUS POLYP OF ASCENDING COLON: Primary | ICD-10-CM

## 2025-04-29 DIAGNOSIS — D12.2 ADENOMA OF ASCENDING COLON: Primary | ICD-10-CM

## 2025-04-29 DIAGNOSIS — E73.9 LACTOSE INTOLERANCE: ICD-10-CM

## 2025-04-29 DIAGNOSIS — T50.905A ADVERSE EFFECT OF DRUG, INITIAL ENCOUNTER: ICD-10-CM

## 2025-04-29 PROCEDURE — 99214 OFFICE O/P EST MOD 30 MIN: CPT | Performed by: INTERNAL MEDICINE

## 2025-04-29 RX ORDER — FAMOTIDINE 40 MG/1
40 TABLET, FILM COATED ORAL 2 TIMES DAILY
Qty: 180 TABLET | Refills: 3 | Status: SHIPPED | OUTPATIENT
Start: 2025-04-29

## 2025-04-29 RX ORDER — ONDANSETRON 4 MG/1
4 TABLET, ORALLY DISINTEGRATING ORAL EVERY 8 HOURS PRN
Qty: 10 TABLET | Refills: 0 | Status: SHIPPED | OUTPATIENT
Start: 2025-04-29

## 2025-04-29 RX ORDER — CLONAZEPAM 1 MG/1
1 TABLET ORAL NIGHTLY
COMMUNITY
Start: 2025-04-10

## 2025-04-29 RX ORDER — BUDESONIDE 3 MG/1
9 CAPSULE, COATED PELLETS ORAL EVERY MORNING
Qty: 100 CAPSULE | Refills: 5 | Status: SHIPPED | OUTPATIENT
Start: 2025-04-29

## 2025-04-29 RX ORDER — PEG-3350, SODIUM SULFATE, SODIUM CHLORIDE, POTASSIUM CHLORIDE, SODIUM ASCORBATE AND ASCORBIC ACID 7.5-2.691G
1 KIT ORAL AS DIRECTED
Qty: 1 EACH | Refills: 1 | Status: SHIPPED | OUTPATIENT
Start: 2025-04-29

## 2025-04-29 NOTE — PATIENT INSTRUCTIONS
PLAN    - Please start budesonide 9 mg daily for 8 weeks   - Then, wean down to 6 mg daily for 2 weeks    - Then, wean down to 3 mg daily for 2 weeks, then off      - Try elimination dairy    - Take famotidine 40 mg twice a day    - Discuss stopping/switching the sertraline with psychiatry     - Follow up in ~6 weeks      Instructions for the procedure  1. Schedule colonoscopy with MAC with Dr. Sharp for EMR [Diagnosis: sessile serrated adenoma in the ascending colon]    2.  bowel prep from pharmacy (split dose Moviprep)    3. Continue all medications for procedure    4. Read all bowel prep instructions carefully    5. AVOID seeds, nuts, popcorn, raw fruits and vegetables (cooked is okay) for 2-3 days before procedure    6. If you start any NEW medication after your visit today, please notify us. Certain medications will need to be held before the procedure, or the procedure cannot be performed.

## 2025-04-29 NOTE — TELEPHONE ENCOUNTER
Schedulers, see providers orders below:     -Patient was seen in office today and was provided with written and verbal procedure prep instructions, including any medication adjustments.   -Patient was advised to call medical insurance for any questions on benefits and/or any out of pocket costs.   -Patient is aware that the GI schedulers will be calling to schedule procedure(s).          Instructions for the procedure  1. Schedule colonoscopy with MAC with Dr. Sharp for EMR [Diagnosis: sessile serrated adenoma in the ascending colon]     2.  bowel prep from pharmacy (split dose Moviprep)     3. Continue all medications for procedure     4. Read all bowel prep instructions carefully     5. AVOID seeds, nuts, popcorn, raw fruits and vegetables (cooked is okay) for 2-3 days before procedure     6. If you start any NEW medication after your visit today, please notify us. Certain medications will need to be held before the procedure, or the procedure cannot be performed.

## 2025-04-29 NOTE — TELEPHONE ENCOUNTER
Scheduled for:  Colonoscopy 50682 w/EMR 40047  Provider Name:  Dr. Sharp   Date:  6/23/2025  Location:  [facility]  Sedation:  MAC  Time:  2:10pm pt is aware emh will call with arrival time  Prep:  Moviprep  Meds/Allergies Reconciled?:  Physician reviewed     Diagnosis with codes:  sessile serrated adenoma in the ascending colon D12.2  Was patient informed to call insurance with codes (Y/N):  Yes     Referral sent?:  Referral was sent at the time of electronic surgical scheduling.   EM or [facility] notified?:  I sent an electronic request to Endo Scheduling and received a confirmation today.      Medication Orders:  n/a  Misc Orders:  n/a     Further instructions given by staff:   I discussed the prep instructions with the patient which she verbally understood and is aware that I will send the instructions today.

## 2025-04-29 NOTE — PROGRESS NOTES
[facility] - Gastroenterology                                                                                                                 Chief Complaint   Patient presents with    Follow - Up       HPI:   Eliana Jean is a 49 year old year-old female with history of depression here for the following:      Pt has had chronic diarrhea with post prandial urgency. She averages 3 loose BMs per day.  Pt has been on sertraline since college and may have had diarrhea since that time. She had a diagnosis of IBS-C in the past. She is lactose intolerant but sometimes still take dairy products.  Of late, she has had more acid reflux than before. It is intermittent but occurring multiple times a week.  Denies dysphagia, f/c, blood in the stool, weight loss, or any other symptoms.     Pt is a pediatrician.     EGD/CLN 3/2025  Impression:  Mild gastritis with scattered antral erosions.  Otherwise unremarkable EGD s/p biopsies of the esophagus, stomach, and duodenum.  One large ascending colon polyp. Not removed as it will require either EMR by interventional GI or surgical resection.    Small internal hemorrhoids.   Otherwise unremarkable ileocolonoscopy s/p biopsies of the ileum and colon.     Final Diagnosis:      A. Duodenum; biopsies:  Small bowel mucosa with no significant pathologic changes.  Preserved villous architecture.  No increase in intraepithelial lymphocytes.     B. Gastric; biopsies:  Gastric mucosa with mild chronic inactive gastritis.  No dysplasia or metaplasia is identified.  Diff-Quik stain (with appropriate control reactivity) is negative for H. pylori microorganisms.     C. Esophagus; biopsies:  Squamous mucosa with mild reactive changes.  No increase in intraepithelial eosinophils is identified.  No dysplasia or metaplasia is identified.     D. Random colon; biopsies:  Lymphocytic (microscopic) colitis.  No granulomas, active colitis, dysplasia or changes  associated with chronicity identified.     E. Ileum; biopsy:   Small bowel mucosa with no significant pathologic changes.  No granulomas, active ileitis, dysplasia or changes associated with chronicity identified.     F. Ascending colon polyp:   Fragments of hyperplastic polyp with features of sessile serrated adenoma.       Soc:  -denies smoking  -occasional EtOH  -denies illicit drug use    Wt Readings from Last 6 Encounters:   04/29/25 138 lb (62.6 kg)   03/12/25 140 lb (63.5 kg)   03/04/25 140 lb (63.5 kg)   10/01/24 140 lb (63.5 kg)   02/15/24 138 lb (62.6 kg)   02/21/23 138 lb (62.6 kg)        History, Medications, Allergies, ROS:      Past Medical History:    Esophageal reflux    History of esophagogastroduodenoscopy (EGD)    Hx of colonoscopy    Optic disc hemorrhage, left    PVCs (premature ventricular contractions)    pt not taking medication    Visual impairment    CONTACTS/GLASSES      Past Surgical History:   Procedure Laterality Date    Colonoscopy N/A 3/19/2025    Procedure: COLONOSCOPY;  Surgeon: Tonya Hinkle MD;  Location: [facility] ENDOSCOPY      Family Hx:   Family History   Problem Relation Age of Onset    Depression Mother     Glaucoma Mother     Other (Other) Mother         Rheumatoid arth    Diabetes Father         type 2    Depression Father     Bleeding Disorders Paternal Grandmother     Heart Disease Maternal Uncle         50s    Macular degeneration Neg     Breast Cancer Neg     Ovarian Cancer Neg     Pancreatic Cancer Neg     Prostate Cancer Neg     Colon Cancer Neg     Uterine Cancer Neg       Social History:   Social History     Socioeconomic History    Marital status:    Tobacco Use    Smoking status: Never    Smokeless tobacco: Never   Vaping Use    Vaping status: Never Used   Substance and Sexual Activity    Alcohol use: Yes     Comment: SOCIALLY    Drug use: No    Sexual activity: Yes     Partners: Male     Birth control/protection: Pill        Medications (Active prior to  today's visit):  Current Outpatient Medications   Medication Sig Dispense Refill    clonazePAM 1 MG Oral Tab Take 1 tablet (1 mg total) by mouth nightly.      budesonide 3 MG Oral Cap DR Particles Take 3 capsules (9 mg total) by mouth every morning. Take 3 capsules (9mg) every day in the morning for up to 8 weeks 100 capsule 5    PEG-KCl-NaCl-NaSulf-Na Asc-C (MOVIPREP) 100 g Oral Recon Soln Take 1 kit by mouth As Directed. Take as directed 1 each 1    famotidine 40 MG Oral Tab Take 1 tablet (40 mg total) by mouth 2 (two) times daily. 180 tablet 3    ondansetron 4 MG Oral Tablet Dispersible Take 1 tablet (4 mg total) by mouth every 8 (eight) hours as needed for Nausea. 10 tablet 0    Norgestim-Eth Estrad Triphasic 0.18/0.215/0.25 MG-35 MCG Oral Tab Take 1 tablet by mouth daily.      Melatonin 1 MG Oral Cap melatonin      clonazePAM 0.5 MG Oral Tab Take 1 tablet (0.5 mg total) by mouth nightly as needed.      sertraline 100 MG Oral Tab Take 1 tablet (100 mg total) by mouth daily.      B Complex Vitamins (B COMPLEX 1 OR) Take 1 tablet by mouth daily.      Cyanocobalamin (B-12) 100 MCG Oral Tab Take 1 tablet by mouth daily.         Allergies:  Allergies[1]    ROS:   CONSTITUTIONAL:  negative for fevers, rigors  EYES:  negative for diplopia   RESPIRATORY:  negative for severe shortness of breath  CARDIOVASCULAR:  negative for crushing sub-sternal chest pain  GASTROINTESTINAL:  see HPI  GENITOURINARY:  negative for dysuria or gross hematuria  INTEGUMENT/BREAST:  SKIN:  negative for jaundice   ALLERGIC/IMMUNOLOGIC:  negative for hay fever  ENDOCRINE:  negative for cold intolerance and heat intolerance  MUSCULOSKELETAL:  negative for joint effusion/severe erythema  BEHAVIOR/PSYCH:  negative for psychotic behavior      PHYSICAL EXAM:   Blood pressure 99/62, pulse 66, height 64\", weight 138 lb (62.6 kg), last menstrual period 02/23/2025.    GEN - Patient appears comfortable and in no acute discomfort  ENT - MMM  EYES - the  sclera appears anicteric  CV - no edema  RESP -  No increased work of breathing  ABDOMEN - soft, non-tender exam in all quadrants without rigidity or guarding, non-distended, no abnormal bowel sounds noted, no masses are palpated  SKIN - No jaundice  NEURO - Alert and appropriate, and gross movements of extremities normal  PSYCH - normal affect, non-agitated      Labs/Imaging:     Patient's pertinent labs and imaging were reviewed and discussed with patient today.      ASSESSMENT/PLAN:   Eliana Jean is a 49 year old year-old female with history of depression here for the following:      Lymphocytic colitis  Sertraline use  - temporal pattern of starting the sertraline correlates with the chronic diarrhea that started in college.  Her lymphocytic colitis may be due to the sertraline. She will discuss this with a psychiatrist to wean it off or switch it to another agent unlikely to cause colitis.  We also discussed that NSAIDs and PPIs can also cause lymphocytic colitis and should be avoided, if possible.  Recommend budesonide 9 mg daily x 8 weeks, then weaning off. Recommend follow up in  ~6 weeks to ensure resolution of symptoms.      Lactose intolerance - recommend avoiding dairy.    Dyspepsia, GERD with esophagitis - noted on EGD.  Recommend increasing famotidine to 40 mg BID. If needed, will consider a PPI but will hold off currently given lymphocytic colitis.     Large ascending colon SSA - We discussed the patient's options for removal of the large ascending colon polyp, including surgical resection or repeat colonoscopy (CLN) with interventional GI for endoscopic mucosal resection (EMR) attempt. The patient understands the possibility that the EMR may be unsuccessful or aborted.  The patient also understands the higher risk of bleeding or perforation with EMR and the likely surveillance interval post-EMR (usually 6-12 months post-EMR). The patient would like to proceed with repeat CLN for EMR.  Will give  moviprep and zofran since the golytely made her feel nauseated.      Recommend    - CLN with MAC with Dr. Sharp from interventional GI for possible EMR  - CLD the day prior, NPO after midnight, split dose Moviprep   - OK to take zofran PRN to help with prep tolerance     - Discuss stopping/switching sertraline with psychiatry given lymphocytic colitis     - Budesonide 9 mg daily x 8 weeks   - Then, wean to 6 mg daily x 2 weeks    - Then, wean to 3 mg daily x 2 weeks, then off    - Dairy elimination    - Famotidine 40 mg BID    - Follow up in ~6 weeks    Colonoscopy consent: I have discussed the risks, benefits, and alternatives to colonoscopy with the patient/primary decision maker [who demonstrated understanding], including but not limited to the risks of bleeding, infection, pain, death, as well as the risks of anesthesia and perforation all leading to prolonged hospitalization, surgical intervention, or even death. I also specifically mentioned the miss rate of colonoscopy of 5-10% in the best of all circumstances. The patient has agreed to sign an informed consent and elected to proceed with colonoscopy with possible intervention [i.e. polypectomy, stent placement, etc.] as indicated.      Orders This Visit:  No orders of the defined types were placed in this encounter.      Meds This Visit:  Requested Prescriptions     Signed Prescriptions Disp Refills    budesonide 3 MG Oral Cap DR Particles 100 capsule 5     Sig: Take 3 capsules (9 mg total) by mouth every morning. Take 3 capsules (9mg) every day in the morning for up to 8 weeks    PEG-KCl-NaCl-NaSulf-Na Asc-C (MOVIPREP) 100 g Oral Recon Soln 1 each 1     Sig: Take 1 kit by mouth As Directed. Take as directed    famotidine 40 MG Oral Tab 180 tablet 3     Sig: Take 1 tablet (40 mg total) by mouth 2 (two) times daily.    ondansetron 4 MG Oral Tablet Dispersible 10 tablet 0     Sig: Take 1 tablet (4 mg total) by mouth every 8 (eight) hours as needed for  Nausea.       Imaging & Referrals:  None         Bhavna Ibrahim, APRN   4/29/2025          [1]   Allergies  Allergen Reactions    No Known Allergies OTHER (SEE COMMENTS)

## 2025-06-17 RX ORDER — MULTIVIT-MIN/IRON/FOLIC ACID/K 18-600-40
1 CAPSULE ORAL DAILY
COMMUNITY

## 2025-06-23 ENCOUNTER — HOSPITAL ENCOUNTER (OUTPATIENT)
Facility: HOSPITAL | Age: 50
Setting detail: HOSPITAL OUTPATIENT SURGERY
Discharge: HOME | End: 2025-06-23
Attending: INTERNAL MEDICINE | Admitting: INTERNAL MEDICINE
Payer: COMMERCIAL

## 2025-06-23 ENCOUNTER — ANESTHESIA (OUTPATIENT)
Dept: ENDOSCOPY | Facility: HOSPITAL | Age: 50
End: 2025-06-23
Payer: COMMERCIAL

## 2025-06-23 ENCOUNTER — ANESTHESIA EVENT (OUTPATIENT)
Dept: ENDOSCOPY | Facility: HOSPITAL | Age: 50
End: 2025-06-23
Payer: COMMERCIAL

## 2025-06-23 VITALS
BODY MASS INDEX: 23.32 KG/M2 | DIASTOLIC BLOOD PRESSURE: 70 MMHG | WEIGHT: 140 LBS | HEIGHT: 65 IN | OXYGEN SATURATION: 99 % | RESPIRATION RATE: 17 BRPM | SYSTOLIC BLOOD PRESSURE: 103 MMHG | TEMPERATURE: 99 F | HEART RATE: 48 BPM

## 2025-06-23 DIAGNOSIS — D12.2 ADENOMA OF ASCENDING COLON: ICD-10-CM

## 2025-06-23 LAB — B-HCG UR QL: NEGATIVE

## 2025-06-23 PROCEDURE — 45390 COLONOSCOPY W/RESECTION: CPT | Performed by: INTERNAL MEDICINE

## 2025-06-23 DEVICE — REPLAY HEMOSTASIS CLIP, 11MM SPAN
Type: IMPLANTABLE DEVICE | Site: COLON | Status: FUNCTIONAL
Brand: REPLAY

## 2025-06-23 RX ORDER — LIDOCAINE HYDROCHLORIDE 10 MG/ML
INJECTION, SOLUTION EPIDURAL; INFILTRATION; INTRACAUDAL; PERINEURAL AS NEEDED
Status: DISCONTINUED | OUTPATIENT
Start: 2025-06-23 | End: 2025-06-23 | Stop reason: SURG

## 2025-06-23 RX ORDER — SODIUM CHLORIDE, SODIUM LACTATE, POTASSIUM CHLORIDE, CALCIUM CHLORIDE 600; 310; 30; 20 MG/100ML; MG/100ML; MG/100ML; MG/100ML
INJECTION, SOLUTION INTRAVENOUS CONTINUOUS
Status: DISCONTINUED | OUTPATIENT
Start: 2025-06-23 | End: 2025-06-23

## 2025-06-23 RX ADMIN — LIDOCAINE HYDROCHLORIDE 50 MG: 10 INJECTION, SOLUTION EPIDURAL; INFILTRATION; INTRACAUDAL; PERINEURAL at 14:35:00

## 2025-06-23 RX ADMIN — SODIUM CHLORIDE, SODIUM LACTATE, POTASSIUM CHLORIDE, CALCIUM CHLORIDE: 600; 310; 30; 20 INJECTION, SOLUTION INTRAVENOUS at 14:35:00

## 2025-06-23 NOTE — DISCHARGE INSTRUCTIONS
Home Care Instructions for Colonoscopy with Sedation    Diet:  - Resume your regular diet as tolerated unless otherwise instructed.  - Start with light meals to minimize bloating.  - Do not drink alcohol today.    Medication:  - If you have questions about resuming your normal medications, please contact your Primary Care Physician.    Activities:  - Take it easy today. Do not return to work today.  - Do not drive today.  - Do not operate any machinery today (including kitchen equipment).    Colonoscopy:  - You may notice some rectal \"spotting\" (a little blood on the toilet tissue) for a day or two after the exam. This is normal.  - If you experience any rectal bleeding (not spotting), persistent tenderness or sharp severe abdominal pains, oral temperature over 100 degrees Fahrenheit, light-headedness or dizziness, or any other problems, contact your doctor.    **If unable to reach your doctor, please go to the [facility] Emergency Room**    - Your referring physician will receive a full report of your examination.  - If you do not hear from your doctor's office within two weeks of your biopsy, please call them for your results.    You may be able to see your laboratory results in [facility] between 4 and 7 business days.  In some cases, your physician may not have viewed the results before they are released to [facility].  If you have questions regarding your results contact the physician who ordered the test/exam by phone or via [facility] by choosing \"Ask a Medical Question.\"

## 2025-06-23 NOTE — ANESTHESIA PREPROCEDURE EVALUATION
Anesthesia PreOp Note    HPI:     Elaina Jean is a 49 year old female who presents for preoperative consultation requested by: Marco Sharp MD    Date of Surgery: 6/23/2025    Procedure(s):  COLONOSCOPY with Endoscopic Mucosal Resection  Indication: Adenoma of ascending colon    Relevant Problems   No relevant active problems       NPO:  Last Liquid Consumption Date: 06/23/25  Last Liquid Consumption Time: 1200  Last Solid Consumption Date: 06/21/25  Last Solid Consumption Time: 1800  Last Liquid Consumption Date: 06/23/25          History Review:  Patient Active Problem List    Diagnosis Date Noted    Polyp of ascending colon 03/19/2025    Gastritis without bleeding 03/19/2025    Optic disc hemorrhage, left 05/08/2021    Chest pressure 12/05/2019    Fatigue 12/05/2019    Routine health maintenance 12/05/2019       Past Medical History[1]    Past Surgical History[2]    Prescriptions Prior to Admission[3]  Current Medications and Prescriptions Ordered in Epic[4]    Allergies[5]    Family History[6]  Social Hx on file[7]    Available pre-op labs reviewed.  Lab Results   Component Value Date    URINEPREG Negative 06/23/2025             Vital Signs:  Body mass index is 23.3 kg/m².   height is 1.651 m (5' 5\") and weight is 63.5 kg (140 lb). Her blood pressure is 97/63 and her pulse is 55. Her respiration is 13 and oxygen saturation is 99%.   Vitals:    06/17/25 1636 06/23/25 1426   BP:  97/63   Pulse:  55   Resp:  13   SpO2:  99%   Weight: 63.5 kg (140 lb) 63.5 kg (140 lb)   Height: 1.626 m (5' 4\") 1.651 m (5' 5\")        Anesthesia Evaluation     Patient summary reviewed and Nursing notes reviewed    Airway   Mallampati: I  TM distance: >3 FB  Neck ROM: full  Dental - Dentition appears grossly intact     Pulmonary - negative ROS and normal exam   Cardiovascular - negative ROS and normal exam    Neuro/Psych      GI/Hepatic/Renal    (+) bowel prep    Endo/Other - negative ROS   Abdominal                   Anesthesia Plan:   ASA:  2  Plan:   MAC  Informed Consent Plan and Risks Discussed With:  Patient  Discussed plan with:  Surgeon      I have informed Eliana Jean and/or legal guardian or family member of the nature of the anesthetic plan, benefits, risks including possible dental damage if relevant, major complications, and any alternative forms of anesthetic management.   All of the patient's questions were answered to the best of my ability. The patient desires the anesthetic management as planned.  Lizzette Chance CRNA  6/23/2025 2:31 PM  Present on Admission:  **None**           [1]   Past Medical History:   Anxiety state    Esophageal reflux    History of esophagogastroduodenoscopy (EGD)    Hx of colonoscopy    Optic disc hemorrhage, left    PVCs (premature ventricular contractions)    pt not taking medication    Visual impairment    CONTACTS/GLASSES   [2]   Past Surgical History:  Procedure Laterality Date    Colonoscopy N/A 3/19/2025    Procedure: COLONOSCOPY;  Surgeon: Tonya Hinkle MD;  Location: [facility] ENDOSCOPY   [3]   Medications Prior to Admission   Medication Sig Dispense Refill Last Dose/Taking    Cholecalciferol (VITAMIN D) 50 MCG (2000 UT) Oral Cap Take 1 capsule (2,000 Units total) by mouth daily.   6/18/2025    budesonide 3 MG Oral Cap DR Particles Take 3 capsules (9 mg total) by mouth every morning. Take 3 capsules (9mg) every day in the morning for up to 8 weeks 100 capsule 5 6/23/2025    Melatonin 1 MG Oral Cap Take 5 capsules (5 mg total) by mouth at bedtime.   Taking    clonazePAM 0.5 MG Oral Tab Take 1 tablet (0.5 mg total) by mouth nightly as needed.   6/22/2025    sertraline 100 MG Oral Tab Take 1 tablet (100 mg total) by mouth in the morning.   6/22/2025    B Complex Vitamins (B COMPLEX 1 OR) Take 1 tablet by mouth in the morning.   6/18/2025    Cyanocobalamin (B-12) 100 MCG Oral Tab Take 1 tablet by mouth in the morning.   6/18/2025    clonazePAM 1 MG Oral Tab Take 0.5 mg by mouth  nightly.       PEG-KCl-NaCl-NaSulf-Na Asc-C (MOVIPREP) 100 g Oral Recon Soln Take 1 kit by mouth As Directed. Take as directed 1 each 1     famotidine 40 MG Oral Tab Take 1 tablet (40 mg total) by mouth 2 (two) times daily. 180 tablet 3     ondansetron 4 MG Oral Tablet Dispersible Take 1 tablet (4 mg total) by mouth every 8 (eight) hours as needed for Nausea. 10 tablet 0     Norgestim-Eth Estrad Triphasic 0.18/0.215/0.25 MG-35 MCG Oral Tab Take 1 tablet by mouth daily.      [4]   Current Facility-Administered Medications Ordered in Epic   Medication Dose Route Frequency Provider Last Rate Last Admin    lactated ringers infusion   Intravenous Continuous aMrco Sharp MD 20 mL/hr at 06/23/25 1427 New Bag at 06/23/25 1427     No current [facility]-ordered outpatient medications on file.   [5]   Allergies  Allergen Reactions    No Known Allergies OTHER (SEE COMMENTS)   [6]   Family History  Problem Relation Age of Onset    Depression Mother     Glaucoma Mother     Other (Other) Mother         Rheumatoid arth    Diabetes Father         type 2    Depression Father     Bleeding Disorders Paternal Grandmother     Heart Disease Maternal Uncle         50s    Macular degeneration Neg     Breast Cancer Neg     Ovarian Cancer Neg     Pancreatic Cancer Neg     Prostate Cancer Neg     Colon Cancer Neg     Uterine Cancer Neg    [7]   Social History  Socioeconomic History    Marital status:    Tobacco Use    Smoking status: Never    Smokeless tobacco: Never   Vaping Use    Vaping status: Never Used   Substance and Sexual Activity    Alcohol use: Yes     Comment: SOCIALLY    Drug use: No    Sexual activity: Yes     Partners: Male     Birth control/protection: Pill

## 2025-06-23 NOTE — H&P
History & Physical Examination    Patient Name: Eliana Jean  MRN: [id number]  [facility]: [id number]  YOB: 1975    Diagnosis: adenomatous polyp of the ascending colon    Colonoscopy in March 2025 with Dr. Hinkle (along with EGD) with findings of a 25-30 mm polyp in the ascending colon referred for EMR today.    Prescriptions Prior to Admission[1]  Current Hospital Medications[2]    Allergies: Allergies[3]    Past Medical History[4]  Past Surgical History[5]  Family History[6]  Social History     Tobacco Use    Smoking status: Never    Smokeless tobacco: Never   Substance Use Topics    Alcohol use: Yes     Comment: SOCIALLY       SYSTEM Check if Physical Exam is Normal If not normal, please explain:   HEENT [ X]    NECK  [ X]    HEART [ X]    LUNGS [ X]    ABDOMEN [ X]    EXTREMITIES [ X]      General:awake, cooperative, no acute distress  HEENT: EOMI, no scleral icterus, MMM; oral pharnyx is without exudates or lesions  Neck: no lymphadenopathy; thyroid is not enlarged and without nodules  CV: RRR  Resp: non-labored breathing  Abd: soft, non-tender, non-distended  Ext: no lower extremity swelling  Neuro: Alert, Oriented X 3  Skin: no rashes, bruises  Psych: normal affect  I have discussed the risks and benefits and alternatives of the procedure with the patient/family.  She understands and agreed to proceed with plan of care.   Colonoscopy with endoscopic mucosal resection consent: I have discussed the risks, benefits, and alternatives (including surgery) to colonoscopy with EMR with the patient [who demonstrated understanding], including but not limited to the risks of bleeding, infection, pain, as well as the risks of anesthesia and perforation all leading to prolonged hospitalization, surgical intervention, or could be life threatening. I also discussed the increased risks of bleeding and perforation beyond standard colonoscopy and likely/anticipated surveillance in 6 months with colonoscopy. All  questions were answered to the patient’s satisfaction. The patient signed informed consent and elected to proceed with colonoscopy with intervention [i.e. endoscopic mucosal resection, polypectomy, biopsy, control of bleeding, etc.] as indicated. I also discussed a ride home from a family member of friend is required and driving after the procedure with sedation is not safe or recommended.  Marco Sharp MD  [facility] - Gastroenterology  6/23/2025  2:21 PM                   [1]   Medications Prior to Admission   Medication Sig Dispense Refill Last Dose/Taking    Cholecalciferol (VITAMIN D) 50 MCG (2000 UT) Oral Cap Take 1 capsule (2,000 Units total) by mouth daily.   Taking    budesonide 3 MG Oral Cap DR Particles Take 3 capsules (9 mg total) by mouth every morning. Take 3 capsules (9mg) every day in the morning for up to 8 weeks 100 capsule 5 Taking    Melatonin 1 MG Oral Cap Take 5 capsules (5 mg total) by mouth at bedtime.   Taking    clonazePAM 0.5 MG Oral Tab Take 1 tablet (0.5 mg total) by mouth nightly as needed.   Taking As Needed    sertraline 100 MG Oral Tab Take 1 tablet (100 mg total) by mouth in the morning.   Taking    B Complex Vitamins (B COMPLEX 1 OR) Take 1 tablet by mouth in the morning.   Taking    Cyanocobalamin (B-12) 100 MCG Oral Tab Take 1 tablet by mouth in the morning.   Taking    clonazePAM 1 MG Oral Tab Take 0.5 mg by mouth nightly.       PEG-KCl-NaCl-NaSulf-Na Asc-C (MOVIPREP) 100 g Oral Recon Soln Take 1 kit by mouth As Directed. Take as directed 1 each 1     famotidine 40 MG Oral Tab Take 1 tablet (40 mg total) by mouth 2 (two) times daily. 180 tablet 3     ondansetron 4 MG Oral Tablet Dispersible Take 1 tablet (4 mg total) by mouth every 8 (eight) hours as needed for Nausea. 10 tablet 0     Norgestim-Eth Estrad Triphasic 0.18/0.215/0.25 MG-35 MCG Oral Tab Take 1 tablet by mouth daily.      [2]   Current Facility-Administered Medications   Medication Dose Route Frequency     lactated ringers infusion   Intravenous Continuous   [3]   Allergies  Allergen Reactions    No Known Allergies OTHER (SEE COMMENTS)   [4]   Past Medical History:   Anxiety state    Esophageal reflux    History of esophagogastroduodenoscopy (EGD)    Hx of colonoscopy    Optic disc hemorrhage, left    PVCs (premature ventricular contractions)    pt not taking medication    Visual impairment    CONTACTS/GLASSES   [5]   Past Surgical History:  Procedure Laterality Date    Colonoscopy N/A 3/19/2025    Procedure: COLONOSCOPY;  Surgeon: Tonya Hinkle MD;  Location: [facility] ENDOSCOPY   [6]   Family History  Problem Relation Age of Onset    Depression Mother     Glaucoma Mother     Other (Other) Mother         Rheumatoid arth    Diabetes Father         type 2    Depression Father     Bleeding Disorders Paternal Grandmother     Heart Disease Maternal Uncle         50s    Macular degeneration Neg     Breast Cancer Neg     Ovarian Cancer Neg     Pancreatic Cancer Neg     Prostate Cancer Neg     Colon Cancer Neg     Uterine Cancer Neg

## 2025-06-23 NOTE — OPERATIVE REPORT
Colonoscopy Report    Eliana Jean     1975 Age 49 year old   PCP Sully John MD Endoscopist Marco Sharp MD     Date of procedure: 25    Procedure: Colonoscopy w/ endoscopic mucosal resection    Pre-operative diagnosis:  adenomatous polyp of the ascending colon     Colonoscopy in 2025 with Dr. Hinkle (along with EGD) with findings of a 25-30 mm polyp in the ascending colon referred for EMR today.    Post-operative diagnosis: see impression    Sedation: monitored anesthesia care (MAC)    Consent: We discussed the risks/benefits and alternatives to this procedure, as well as the planned sedation. Informed consent was obtained from the patient after the risks of the procedure were discussed, including but not limited to bleeding, perforation, aspiration, infection, or possibility of a missed lesion as well as the risks of anesthesia including but not limited to cardiopulmonary complications. The patient signed informed consent and elected to proceed with Colonoscopy with intervention [i.e. Biopsy, control of bleeding, dilatation, polypectomy, endoscopic mucosal resection, etc.] as indicated.    Colonoscopy procedure: Once an adequate level of sedation was obtained a digital rectal exam was completed revealing normal tone and no masses palpated. Then the lubricated tip of the [id number] diagnostic video colonoscope was carefully inserted and advanced without difficulty to the cecum using the air insufflation technique (only Co2 was used for insufflation). The cecum was identified by localizing the trifold, the appendix and the ileocecal valve. Withdrawal was begun with thorough washing and careful examination of the colonic walls and folds. The ascending colon was viewed twice in the forward view. Photodocumentation was obtained. The bowel prep was good. Views of the colon were good with washing. Withdrawal time was 42 minutes.    Air was then withdrawn and the endoscope  was removed. The patient tolerated the procedure well. There were no immediate postoperative complications. The patient’s vital signs were monitored throughout the procedure and remained stable.    Estimated blood loss: insignificant    Specimens collected:  colon polyps    Complications: none     Colonoscopy findings:    The pediatric colonoscope fitted with a clear cap was advanced unremarkably to the cecum and withdrawn in standard fashion.  In the distal ascending colon near the hepatic flexure there were 2 sessile serrated polyps opposite each other.  They measured approximately 2.5 cm in size each.  There were no features suggesting deep invasion.  They were both lifted with submucosal injection of lifting agent (Eleview).  They were then removed piecemeal with snare cautery assisted polypectomy.  The polypectomy sites were inspected without perforation or deep muscle injury.  The polypectomy edges were then applied with the tip of the snare using soft coagulation.  The sites were then closed with placement of endoscopic hemoclips.  4 clips were used for 1 and 3 for the other.  The polyp pieces were then retrieved.  The colonoscope was withdrawn in standard fashion with limited views of the rest of the colon unremarkable.    Impression:  1. Two sessile serrated polyps at the hepatic flexure measuring 2.5 cm each and both removed    Recommend:  1. Await pathology.   2. Repeat colonoscopy likely in 6 months pending final pathology results. If new signs or symptoms develop, procedure may need to be repeated sooner.   3. Continue your current medications  4. Follow up with your primary care physician on a routine basis    >>>If biopsies were performed and you have not received your pathology results either by phone or letter within 2 weeks, please call our office at [phone].    MD Earnest [facility] - Gastroenterology  6/23/2025

## 2025-06-23 NOTE — ANESTHESIA POSTPROCEDURE EVALUATION
Patient: Eliana Jean    Procedure Summary       Date: 06/23/25 Room / Location: [facility] ENDOSCOPY 01 / [facility] ENDOSCOPY    Anesthesia Start: 1435 Anesthesia Stop: 1533    Procedure: COLONOSCOPY with Endoscopic Mucosal Resection Diagnosis:       Adenoma of ascending colon      (polyps)    Surgeons: Marco Sharp MD Anesthesiologist: Lizzette Chance CRNA    Anesthesia Type: MAC ASA Status: 2            Anesthesia Type: MAC    Vitals Value Taken Time   /76 06/23/25 15:32   Temp 98.6 °F (37 °C) 06/23/25 15:32   Pulse 57 06/23/25 15:32   Resp 17 06/23/25 15:32   SpO2 99 % 06/23/25 15:32   Vitals shown include unfiled device data.    [facility] AN Post Evaluation:   Patient Evaluated in PACU  Patient Participation: complete - patient participated  Level of Consciousness: sleepy but conscious  Pain Score: 0  Pain Management: adequate  Airway Patency:patent  Dental exam unchanged from preop  Yes    Nausea/Vomiting: none  Cardiovascular Status: acceptable  Respiratory Status: acceptable and room air  Postoperative Hydration acceptable      Lizzette Chance CRNA  6/23/2025 3:33 PM

## 2025-06-24 ENCOUNTER — APPOINTMENT (OUTPATIENT)
Dept: CT IMAGING | Facility: HOSPITAL | Age: 50
End: 2025-06-24
Attending: EMERGENCY MEDICINE
Payer: COMMERCIAL

## 2025-06-24 ENCOUNTER — HOSPITAL ENCOUNTER (OUTPATIENT)
Facility: HOSPITAL | Age: 50
Setting detail: OBSERVATION
Discharge: HOME | End: 2025-06-25
Attending: EMERGENCY MEDICINE | Admitting: HOSPITALIST
Payer: COMMERCIAL

## 2025-06-24 ENCOUNTER — TELEPHONE (OUTPATIENT)
Facility: CLINIC | Age: 50
End: 2025-06-24

## 2025-06-24 DIAGNOSIS — Z86.0100 STATUS POST COLON POLYPECTOMY: Primary | ICD-10-CM

## 2025-06-24 DIAGNOSIS — K65.9 PERITONITIS (HCC): ICD-10-CM

## 2025-06-24 DIAGNOSIS — Z98.890 STATUS POST COLON POLYPECTOMY: Primary | ICD-10-CM

## 2025-06-24 PROBLEM — R73.9 HYPERGLYCEMIA: Status: ACTIVE | Noted: 2025-06-24

## 2025-06-24 PROBLEM — D69.6 THROMBOCYTOPENIA: Status: ACTIVE | Noted: 2025-06-24

## 2025-06-24 PROBLEM — R10.9 ABDOMINAL PAIN: Status: ACTIVE | Noted: 2025-06-24

## 2025-06-24 LAB
ALBUMIN SERPL-MCNC: 4.4 G/DL (ref 3.2–4.8)
ALBUMIN/GLOB SERPL: 2.1 {RATIO} (ref 1–2)
ALP LIVER SERPL-CCNC: 65 U/L (ref 39–100)
ALT SERPL-CCNC: 44 U/L (ref 10–49)
ANION GAP SERPL CALC-SCNC: 5 MMOL/L (ref 0–18)
AST SERPL-CCNC: 45 U/L (ref ?–34)
B-HCG UR QL: NEGATIVE
BASOPHILS # BLD AUTO: 0.04 X10(3) UL (ref 0–0.2)
BASOPHILS NFR BLD AUTO: 0.4 %
BILIRUB SERPL-MCNC: 0.7 MG/DL (ref 0.3–1.2)
BUN BLD-MCNC: 7 MG/DL (ref 9–23)
BUN/CREAT SERPL: 7 (ref 10–20)
CALCIUM BLD-MCNC: 9 MG/DL (ref 8.7–10.4)
CHLORIDE SERPL-SCNC: 104 MMOL/L (ref 98–112)
CO2 SERPL-SCNC: 30 MMOL/L (ref 21–32)
CREAT BLD-MCNC: 1 MG/DL (ref 0.55–1.02)
DEPRECATED RDW RBC AUTO: 49 FL (ref 35.1–46.3)
EGFRCR SERPLBLD CKD-EPI 2021: 69 ML/MIN/1.73M2 (ref 60–?)
EOSINOPHIL # BLD AUTO: 0.05 X10(3) UL (ref 0–0.7)
EOSINOPHIL NFR BLD AUTO: 0.5 %
ERYTHROCYTE [DISTWIDTH] IN BLOOD BY AUTOMATED COUNT: 12.9 % (ref 11–15)
GLOBULIN PLAS-MCNC: 2.1 G/DL (ref 2–3.5)
GLUCOSE BLD-MCNC: 105 MG/DL (ref 70–99)
HCT VFR BLD AUTO: 44 % (ref 35–48)
HGB BLD-MCNC: 14.6 G/DL (ref 12–16)
IMM GRANULOCYTES # BLD AUTO: 0.03 X10(3) UL (ref 0–1)
IMM GRANULOCYTES NFR BLD: 0.3 %
LYMPHOCYTES # BLD AUTO: 0.84 X10(3) UL (ref 1–4)
LYMPHOCYTES NFR BLD AUTO: 9 %
MCH RBC QN AUTO: 34.2 PG (ref 26–34)
MCHC RBC AUTO-ENTMCNC: 33.2 G/DL (ref 31–37)
MCV RBC AUTO: 103 FL (ref 80–100)
MONOCYTES # BLD AUTO: 0.42 X10(3) UL (ref 0.1–1)
MONOCYTES NFR BLD AUTO: 4.5 %
NEUTROPHILS # BLD AUTO: 7.93 X10 (3) UL (ref 1.5–7.7)
NEUTROPHILS # BLD AUTO: 7.93 X10(3) UL (ref 1.5–7.7)
NEUTROPHILS NFR BLD AUTO: 85.3 %
OSMOLALITY SERPL CALC.SUM OF ELEC: 286 MOSM/KG (ref 275–295)
PLATELET # BLD AUTO: 145 10(3)UL (ref 150–450)
POTASSIUM SERPL-SCNC: 3.9 MMOL/L (ref 3.5–5.1)
PROT SERPL-MCNC: 6.5 G/DL (ref 5.7–8.2)
RBC # BLD AUTO: 4.27 X10(6)UL (ref 3.8–5.3)
SODIUM SERPL-SCNC: 139 MMOL/L (ref 136–145)
WBC # BLD AUTO: 9.3 X10(3) UL (ref 4–11)

## 2025-06-24 PROCEDURE — 99222 1ST HOSP IP/OBS MODERATE 55: CPT | Performed by: INTERNAL MEDICINE

## 2025-06-24 PROCEDURE — 99222 1ST HOSP IP/OBS MODERATE 55: CPT | Performed by: HOSPITALIST

## 2025-06-24 PROCEDURE — 74177 CT ABD & PELVIS W/CONTRAST: CPT | Performed by: EMERGENCY MEDICINE

## 2025-06-24 RX ORDER — METRONIDAZOLE 500 MG/100ML
500 INJECTION, SOLUTION INTRAVENOUS EVERY 12 HOURS
Status: DISCONTINUED | OUTPATIENT
Start: 2025-06-24 | End: 2025-06-24

## 2025-06-24 RX ORDER — MORPHINE SULFATE 2 MG/ML
2 INJECTION, SOLUTION INTRAMUSCULAR; INTRAVENOUS ONCE
Status: COMPLETED | OUTPATIENT
Start: 2025-06-24 | End: 2025-06-24

## 2025-06-24 RX ORDER — MORPHINE SULFATE 2 MG/ML
2 INJECTION, SOLUTION INTRAMUSCULAR; INTRAVENOUS EVERY 2 HOUR PRN
Status: DISCONTINUED | OUTPATIENT
Start: 2025-06-24 | End: 2025-06-25

## 2025-06-24 RX ORDER — ACETAMINOPHEN 500 MG
500 TABLET ORAL EVERY 4 HOURS PRN
Status: DISCONTINUED | OUTPATIENT
Start: 2025-06-24 | End: 2025-06-25

## 2025-06-24 RX ORDER — MORPHINE SULFATE 2 MG/ML
1 INJECTION, SOLUTION INTRAMUSCULAR; INTRAVENOUS EVERY 2 HOUR PRN
Status: DISCONTINUED | OUTPATIENT
Start: 2025-06-24 | End: 2025-06-25

## 2025-06-24 RX ORDER — ONDANSETRON 2 MG/ML
4 INJECTION INTRAMUSCULAR; INTRAVENOUS ONCE
Status: COMPLETED | OUTPATIENT
Start: 2025-06-24 | End: 2025-06-24

## 2025-06-24 RX ORDER — TEMAZEPAM 15 MG/1
15 CAPSULE ORAL NIGHTLY PRN
Status: DISCONTINUED | OUTPATIENT
Start: 2025-06-24 | End: 2025-06-25

## 2025-06-24 RX ORDER — ONDANSETRON 2 MG/ML
4 INJECTION INTRAMUSCULAR; INTRAVENOUS EVERY 6 HOURS PRN
Status: DISCONTINUED | OUTPATIENT
Start: 2025-06-24 | End: 2025-06-25

## 2025-06-24 RX ORDER — METRONIDAZOLE 500 MG/100ML
500 INJECTION, SOLUTION INTRAVENOUS EVERY 12 HOURS
Status: DISCONTINUED | OUTPATIENT
Start: 2025-06-25 | End: 2025-06-25

## 2025-06-24 RX ORDER — SODIUM CHLORIDE, SODIUM LACTATE, POTASSIUM CHLORIDE, CALCIUM CHLORIDE 600; 310; 30; 20 MG/100ML; MG/100ML; MG/100ML; MG/100ML
INJECTION, SOLUTION INTRAVENOUS CONTINUOUS
Status: DISCONTINUED | OUTPATIENT
Start: 2025-06-24 | End: 2025-06-25

## 2025-06-24 RX ORDER — METRONIDAZOLE 500 MG/100ML
500 INJECTION, SOLUTION INTRAVENOUS ONCE
Status: COMPLETED | OUTPATIENT
Start: 2025-06-24 | End: 2025-06-24

## 2025-06-24 RX ORDER — MORPHINE SULFATE 4 MG/ML
4 INJECTION, SOLUTION INTRAMUSCULAR; INTRAVENOUS EVERY 2 HOUR PRN
Status: DISCONTINUED | OUTPATIENT
Start: 2025-06-24 | End: 2025-06-25

## 2025-06-24 RX ORDER — PROCHLORPERAZINE EDISYLATE 5 MG/ML
5 INJECTION INTRAMUSCULAR; INTRAVENOUS EVERY 8 HOURS PRN
Status: DISCONTINUED | OUTPATIENT
Start: 2025-06-24 | End: 2025-06-25

## 2025-06-24 NOTE — ED QUICK NOTES
Orders for admission, patient is aware of plan and ready to go upstairs. Any questions, please call ED RN JURATE at extension [phone].     Patient Covid vaccination status: Fully vaccinated     COVID Test Ordered in ED: None    COVID Suspicion at Admission: N/A    Running Infusions: Medication Infusions[1] None    Mental Status/LOC at time of transport: AXOX4    Other pertinent information:   CIWA score: N/A   NIH score:  N/A             [1]

## 2025-06-24 NOTE — TELEPHONE ENCOUNTER
Per patient had a colonoscopy yesterday and two polyps were removed and states she still has abdominal pain this morning and asking if this is normal. Please advise

## 2025-06-24 NOTE — CONSULTS
[facility]   Gastroenterology Consultation Note    Eliana Jean  Patient Status:    Emergency  Date of Admission:         6/24/2025, Hospital day #0  Attending:   Peter Bunch MD  PCP:     Sully John MD    Reason for Consultation:  Post colonoscopy with EMR abdominal pain    History of Present Illness:  Eliana Jean is a a(n) 49 year old female w/ a history of microscopic colitis, who presents with abdominal pain.  Patient underwent colonoscopy with endoscopic mucosal resection of 2 large polyps at the distal ascending colon/hepatic flexure.  States later in the evening she developed sensation of abdominal fullness, lower abdominal pain with movement/walking.  No nausea or vomiting.  No fevers or chills.  She had a soft when she nonbloody bowel movement earlier today.  Ate a bagel yesterday.     History:  Past Medical History[1]  Past Surgical History[2]  Family History[3]   reports that she has never smoked. She has never used smokeless tobacco. She reports current alcohol use. She reports that she does not use drugs.    Allergies:  Allergies[4]    Medications:  Current Hospital Medications[5]  Prescriptions Prior to Admission[6]    Review of Systems:  CONSTITUTIONAL:  negative for fevers, rigors  EYES:  negative for diplopia   RESPIRATORY:  negative for severe shortness of breath  CARDIOVASCULAR:  negative for crushing sub-sternal chest pain  GASTROINTESTINAL:  see HPI  GENITOURINARY:  negative for dysuria or gross hematuria  INTEGUMENT/BREAST:  SKIN:  negative for jaundice   ALLERGIC/IMMUNOLOGIC:  negative for hay fever  ENDOCRINE:  negative for cold intolerance and heat intolerance  MUSCULOSKELETAL:  negative for joint effusion/severe erythema  NEURO: negative for dizziness or loss of conscious or paresthesias  BEHAVIOR/PSYCH:  negative for psychotic behavior    Physical Exam:    Blood pressure 104/58, pulse 66, temperature 98 °F (36.7 °C), resp. rate 20, weight 140 lb  (63.5 kg), last menstrual period 06/13/2025, SpO2 98%. Body mass index is 23.3 kg/m².    General: awake, alert and oriented, no acute distress  HEENT: moist mucus membranes  PULM: no conversational dyspnea  CARDIOVASCULAR: regular rate and rhythm, the extremities are warm and well perfused  GI: soft, mild distention, mild tenderness upon palpation in lower abdomen, + BS, no guarding  EXTREMITIES: no edema, moving all extremities  SKIN: no visible rash  NEURO: appropriate and interactive    Laboratory Data:  Lab Results   Component Value Date    WBC 9.3 06/24/2025    HGB 14.6 06/24/2025    HCT 44.0 06/24/2025    .0 06/24/2025    CREATSERUM 1.00 06/24/2025    BUN 7 06/24/2025     06/24/2025    K 3.9 06/24/2025     06/24/2025    CO2 30.0 06/24/2025     06/24/2025    CA 9.0 06/24/2025    ALB 4.4 06/24/2025    ALKPHO 65 06/24/2025    BILT 0.7 06/24/2025    TP 6.5 06/24/2025    AST 45 06/24/2025    ALT 44 06/24/2025       Imaging:  CT ABDOMEN+PELVIS(CONTRAST ONLY)(CPT=74177)  Result Date: 6/24/2025  CONCLUSION: Fluid in the cecum is nonspecific and may reflect a mild enteritis. Caliber change at the distal transverse colon is nonspecific and may reflect partial obstruction or focal ileus, but may simply be secondary to peristalsis. No wall thickening. No evidence of perforation. The appendix is not seen but there is no pericecal inflammatory changes. Postbiopsy changes are noted with clips seen at the splenic flexure. Additional chronic and/or incidental findings are detailed in the body of this report. Electronically Verified and Signed by Attending Radiologist: Orville Ellis MD 6/24/2025 2:13 PM Workstation: [id number]      Assessment & Plan   Eliana Jean is a a(n) 49 year old female w/ a history of microscopic colitis, who presents with abdominal pain post colonoscopy with EMR.  No fevers.  No leukocytosis.  CT abdomen pelvis shows no significant wall thickening or perforation.  There is  nonspecific fluid in the right colon and caliber change at the distal transverse colon, possible focal ileus.     Although there was no significant wall thickening on CT, given recent cautery polypectomy and presence of abdominal pain would recommend empiric IV antibiotics for possible early post polypectomy syndrome. Clear liquids for tonight, IVF.     Recommend:  - empiric IV abx  - observation overnight  - clears liquids, IVF    Sully Hutchison MD  [facility] Gastroenterology  6/24/2025    This note was partially prepared using Dragon Medical voice recognition dictation software. As a result, errors may occur. When identified, these errors have been corrected. While every attempt is made to correct errors during dictation, discrepancies may still exist.          [1]   Past Medical History:   Anxiety state    Esophageal reflux    History of esophagogastroduodenoscopy (EGD)    Hx of colonoscopy    Optic disc hemorrhage, left    PVCs (premature ventricular contractions)    pt not taking medication    Visual impairment    CONTACTS/GLASSES   [2]   Past Surgical History:  Procedure Laterality Date    Colonoscopy N/A 3/19/2025    Procedure: COLONOSCOPY;  Surgeon: Tonya Hinkle MD;  Location: [facility] ENDOSCOPY    Colonoscopy N/A 6/23/2025    Procedure: COLONOSCOPY with Endoscopic Mucosal Resection;  Surgeon: Marco Sharp MD;  Location: [facility] ENDOSCOPY   [3]   Family History  Problem Relation Age of Onset    Depression Mother     Glaucoma Mother     Other (Other) Mother         Rheumatoid arth    Diabetes Father         type 2    Depression Father     Bleeding Disorders Paternal Grandmother     Heart Disease Maternal Uncle         50s    Macular degeneration Neg     Breast Cancer Neg     Ovarian Cancer Neg     Pancreatic Cancer Neg     Prostate Cancer Neg     Colon Cancer Neg     Uterine Cancer Neg    [4]   Allergies  Allergen Reactions    No Known Allergies OTHER (SEE COMMENTS)   [5]   Current Facility-Administered  Medications:     cefTRIAXone (Rocephin) 2 g in sodium chloride 0.9% 100mL IVPB-FRANCIE, 2 g, Intravenous, Once    metroNIDAZOLE in sodium chloride 0.79% (Flagyl) 5 mg/mL IVPB premix 500 mg, 500 mg, Intravenous, Once    morphINE PF 2 MG/ML injection 2 mg, 2 mg, Intravenous, Once  [6] (Not in a hospital admission)

## 2025-06-24 NOTE — H&P
[facility]    PATIENT'S NAME: ARIC THAPA   ATTENDING PHYSICIAN: Peter Pineda MD   PATIENT ACCOUNT#:   [id number]    LOCATION:  [address]  MEDICAL RECORD #:   [id number]       YOB: 1975  ADMISSION DATE:       06/24/2025    HISTORY AND PHYSICAL EXAMINATION    CHIEF COMPLAINT:  Abdominal pain post cholecystectomy and polypectomy.     HISTORY OF PRESENT ILLNESS:  Patient is a 49-year-old  female who had a colonoscopy and polypectomy yesterday.  Toward the evening, started feeling generalized abdominal pain that progressed until this morning.  She came into the emergency department for evaluation.  CBC showed white blood cell count of 9.3 with left shift, hemoglobin 14.6, .  Chemistry and liver function tests were unremarkable.  CT scan of the abdomen showed fluid in the cecum, nonspecific, and may reflect mild enteritis; caliber change in the distal transverse colon which is nonspecific, may reflect partial obstruction versus focal ileus; no evidence of perforation or free peritoneal air.  Patient as started empirically on IV antibiotics, and she will be observed overnight.    PAST MEDICAL HISTORY:  No significant past medical history except mild gastroesophageal reflux disease.     PAST SURGICAL HISTORY:  Colonoscopy and polypectomy yesterday.     MEDICATIONS:  Please see medication reconciliation list.      ALLERGIES:  No known drug allergies.    FAMILY HISTORY:  Mother had rheumatoid arthritis.  Father had diabetes mellitus type 2.     SOCIAL HISTORY:  No tobacco.  Occasional alcohol.  At baseline, independent in her basic activities of daily living.     REVIEW OF SYSTEMS:  Generalized abdominal discomfort since last night.  She had a bowel movement earlier today without significant abnormality.  No nausea or vomiting.  No subjective fever or chills.  Other 12-point review of systems is negative.        PHYSICAL EXAMINATION:    GENERAL:  Alert, oriented to  time, place, and person.  Mild to moderate distress.   VITAL SIGNS:  Temperature 98.0, pulse 66, respiratory rate 20, blood pressure 104/58, pulse ox 98% on room air.    HEENT:  Atraumatic.  Oropharynx clear.  Dry mucous membranes.  Ears, nose normal.  Eyes:  Anicteric sclerae.    NECK:  Supple.  No lymphadenopathy.  Trachea midline.  Full range of motion.    LUNGS:  Clear to auscultation bilaterally.  Normal respiratory effort.   HEART:  Regular rate and rhythm.  S1, S2 auscultated.  No murmur.    ABDOMEN:  Soft, nondistended.  Tenderness to palpation with mild rebound tenderness noted overall in the abdomen.  Hypoactive bowel sounds.   EXTREMITIES:  No peripheral edema, clubbing, or cyanosis.    NEUROLOGIC:  No focal defect.    ASSESSMENT AND PLAN:  Abdominal pain with rebound tenderness.  No evidence of perforation on CT scan of the abdomen, post colonoscopy and polypectomy yesterday.  Patient will be started on IV antibiotics, Rocephin and Flagyl.  IV fluids, n.p.o. except sips of clear liquid.  Obtain gastroenterology and general surgery consults.  Monitor hemodynamic status and temperature curve.  Further recommendations to follow.    Dictated By Cassidy Mackenzie MD  d: 06/24/2025 15:33:01  t: 06/24/2025 15:42:46  Job [id number]  FB/

## 2025-06-24 NOTE — TELEPHONE ENCOUNTER
I called and spoke with the patient. Discussed her symptoms. She notes abdominal pain with movement/walking, etc starting last night. Discussed this can occur. Discussed suspicious for a post polypectomy syndrome rather than perforation but recommend ED evaluation asap for likely need for CT scan, blood tests, IV fluids, likely IV antibiotics and possible admission for management. She expressed understanding and was appreciative of the call.    Communicated with ED staff and inpatient GI hospitalist    Marco Sharp MD  [facility] - Gastroenterology  6/24/2025  9:59 AM

## 2025-06-24 NOTE — TELEPHONE ENCOUNTER
Dr. Sharp,    I spoke to patient, states has abdominal pain, hurts to walk/move. Started later in the evening after procedure, a little more pain on rebound when pressing. Had a bowel movement and took gas-x with minimal change. No vomiting/nausea/fever.     Asking if this is expected with polyp removal since it didn't happen with previous colonoscopy.

## 2025-06-24 NOTE — ED INITIAL ASSESSMENT (HPI)
Patient complains of abd pain s/p colonoscopy yesterday, denies blood, told to come her by doctor for further eval

## 2025-06-25 ENCOUNTER — TELEPHONE (OUTPATIENT)
Facility: CLINIC | Age: 50
End: 2025-06-25

## 2025-06-25 VITALS
RESPIRATION RATE: 17 BRPM | DIASTOLIC BLOOD PRESSURE: 64 MMHG | BODY MASS INDEX: 24 KG/M2 | TEMPERATURE: 98 F | OXYGEN SATURATION: 96 % | HEART RATE: 57 BPM | WEIGHT: 141.81 LBS | SYSTOLIC BLOOD PRESSURE: 104 MMHG

## 2025-06-25 LAB
ANION GAP SERPL CALC-SCNC: 4 MMOL/L (ref 0–18)
BASOPHILS # BLD AUTO: 0.03 X10(3) UL (ref 0–0.2)
BASOPHILS NFR BLD AUTO: 0.5 %
BUN BLD-MCNC: 6 MG/DL (ref 9–23)
BUN/CREAT SERPL: 6.5 (ref 10–20)
CALCIUM BLD-MCNC: 8.6 MG/DL (ref 8.7–10.4)
CHLORIDE SERPL-SCNC: 108 MMOL/L (ref 98–112)
CO2 SERPL-SCNC: 28 MMOL/L (ref 21–32)
CREAT BLD-MCNC: 0.92 MG/DL (ref 0.55–1.02)
DEPRECATED RDW RBC AUTO: 49.2 FL (ref 35.1–46.3)
EGFRCR SERPLBLD CKD-EPI 2021: 76 ML/MIN/1.73M2 (ref 60–?)
EOSINOPHIL # BLD AUTO: 0.11 X10(3) UL (ref 0–0.7)
EOSINOPHIL NFR BLD AUTO: 2 %
ERYTHROCYTE [DISTWIDTH] IN BLOOD BY AUTOMATED COUNT: 12.8 % (ref 11–15)
GLUCOSE BLD-MCNC: 89 MG/DL (ref 70–99)
HCT VFR BLD AUTO: 38.6 % (ref 35–48)
HGB BLD-MCNC: 12.7 G/DL (ref 12–16)
IMM GRANULOCYTES # BLD AUTO: 0.03 X10(3) UL (ref 0–1)
IMM GRANULOCYTES NFR BLD: 0.5 %
LYMPHOCYTES # BLD AUTO: 1.31 X10(3) UL (ref 1–4)
LYMPHOCYTES NFR BLD AUTO: 23.7 %
MCH RBC QN AUTO: 34.1 PG (ref 26–34)
MCHC RBC AUTO-ENTMCNC: 32.9 G/DL (ref 31–37)
MCV RBC AUTO: 103.8 FL (ref 80–100)
MONOCYTES # BLD AUTO: 0.25 X10(3) UL (ref 0.1–1)
MONOCYTES NFR BLD AUTO: 4.5 %
NEUTROPHILS # BLD AUTO: 3.8 X10 (3) UL (ref 1.5–7.7)
NEUTROPHILS # BLD AUTO: 3.8 X10(3) UL (ref 1.5–7.7)
NEUTROPHILS NFR BLD AUTO: 68.8 %
OSMOLALITY SERPL CALC.SUM OF ELEC: 287 MOSM/KG (ref 275–295)
PLATELET # BLD AUTO: 131 10(3)UL (ref 150–450)
PLATELETS.RETICULATED NFR BLD AUTO: 5.8 % (ref 0–7)
POTASSIUM SERPL-SCNC: 3.9 MMOL/L (ref 3.5–5.1)
RBC # BLD AUTO: 3.72 X10(6)UL (ref 3.8–5.3)
SODIUM SERPL-SCNC: 140 MMOL/L (ref 136–145)
WBC # BLD AUTO: 5.5 X10(3) UL (ref 4–11)

## 2025-06-25 PROCEDURE — 99232 SBSQ HOSP IP/OBS MODERATE 35: CPT | Performed by: REGISTERED NURSE

## 2025-06-25 PROCEDURE — 99239 HOSP IP/OBS DSCHRG MGMT >30: CPT | Performed by: INTERNAL MEDICINE

## 2025-06-25 PROCEDURE — 99233 SBSQ HOSP IP/OBS HIGH 50: CPT | Performed by: INTERNAL MEDICINE

## 2025-06-25 RX ORDER — LEVOFLOXACIN 750 MG/1
750 TABLET, FILM COATED ORAL DAILY
Qty: 3 TABLET | Refills: 0 | Status: SHIPPED | OUTPATIENT
Start: 2025-06-25 | End: 2025-06-28

## 2025-06-25 RX ORDER — CLONAZEPAM 0.5 MG/1
0.5 TABLET ORAL NIGHTLY
Status: DISCONTINUED | OUTPATIENT
Start: 2025-06-25 | End: 2025-06-25

## 2025-06-25 RX ORDER — SERTRALINE HYDROCHLORIDE 100 MG/1
100 TABLET, FILM COATED ORAL DAILY
Status: DISCONTINUED | OUTPATIENT
Start: 2025-06-25 | End: 2025-06-25

## 2025-06-25 RX ORDER — BUDESONIDE 3 MG/1
9 CAPSULE, COATED PELLETS ORAL EVERY MORNING
Status: DISCONTINUED | OUTPATIENT
Start: 2025-06-25 | End: 2025-06-25

## 2025-06-25 RX ORDER — METRONIDAZOLE 500 MG/1
500 TABLET ORAL 3 TIMES DAILY
Qty: 9 TABLET | Refills: 0 | Status: SHIPPED | OUTPATIENT
Start: 2025-06-25 | End: 2025-06-28

## 2025-06-25 RX ORDER — HYDROCODONE BITARTRATE AND ACETAMINOPHEN 5; 325 MG/1; MG/1
1 TABLET ORAL EVERY 6 HOURS PRN
Refills: 0 | Status: DISCONTINUED | OUTPATIENT
Start: 2025-06-25 | End: 2025-06-25

## 2025-06-25 NOTE — ED PROVIDER NOTES
Patient Seen in: [facility] 4w/sw/se        History  Chief Complaint   Patient presents with    Abdominal Pain     Stated Complaint: colonoscopy yesterday; worsening pain and discomfort. Lila WESTFALL requesting C*    Subjective:   HPI            Pt presents with severe abdominal pain following colonoscopy and polypectomy performed yesterda. Pain with walking and moving. No vomiting, moving bowels.       Objective:     Past Medical History:    Anxiety state    Esophageal reflux    History of esophagogastroduodenoscopy (EGD)    Hx of colonoscopy    Optic disc hemorrhage, left    PVCs (premature ventricular contractions)    pt not taking medication    Visual impairment    CONTACTS/GLASSES              Past Surgical History:   Procedure Laterality Date    Colonoscopy N/A 3/19/2025    Procedure: COLONOSCOPY;  Surgeon: Tonya Hinkle MD;  Location: [facility] ENDOSCOPY    Colonoscopy N/A 6/23/2025    Procedure: COLONOSCOPY with Endoscopic Mucosal Resection;  Surgeon: Marco Sharp MD;  Location: [facility] ENDOSCOPY                Social History     Socioeconomic History    Marital status:    Tobacco Use    Smoking status: Never    Smokeless tobacco: Never   Vaping Use    Vaping status: Never Used   Substance and Sexual Activity    Alcohol use: Yes     Comment: SOCIALLY    Drug use: No    Sexual activity: Yes     Partners: Male     Birth control/protection: Pill     Social Drivers of Health     Food Insecurity: No Food Insecurity (6/24/2025)    NCSS - Food Insecurity     Worried About Running Out of Food in the Last Year: No     Ran Out of Food in the Last Year: No   Transportation Needs: No Transportation Needs (6/24/2025)    NCSS - Transportation     Lack of Transportation: No   Housing Stability: Not At Risk (6/24/2025)    NCSS - Housing/Utilities     Has Housing: Yes     Worried About Losing Housing: No     Unable to Get Utilities: No                                Physical Exam    ED Triage Vitals   BP  06/24/25 1021 105/68   Pulse 06/24/25 1021 69   Resp 06/24/25 1021 18   Temp 06/24/25 1021 98 °F (36.7 °C)   Temp src 06/25/25 0512 Oral   SpO2 06/24/25 1021 98 %   O2 Device 06/24/25 1200 None (Room air)       Current Vitals:   Vital Signs  BP: 104/64  Pulse: 57  Resp: 17  Temp: 98.3 °F (36.8 °C)  Temp src: Oral  MAP (mmHg): 77    Oxygen Therapy  SpO2: 96 %  O2 Device: None (Room air)  Pulse Oximetry Type: Intermittent  Oximetry Probe Site Changed: No  Pulse Ox Probe Location: Right hand            Physical Exam  Vitals and nursing note reviewed.   Constitutional:       General: She is not in acute distress.     Appearance: She is well-developed.   HENT:      Head: Normocephalic.      Nose: Nose normal.      Mouth/Throat:      Mouth: Mucous membranes are moist.   Eyes:      Conjunctiva/sclera: Conjunctivae normal.   Cardiovascular:      Rate and Rhythm: Normal rate and regular rhythm.      Heart sounds: No murmur heard.  Pulmonary:      Effort: Pulmonary effort is normal. No respiratory distress.      Breath sounds: Normal breath sounds.   Abdominal:      General: There is no distension.      Palpations: Abdomen is soft.      Tenderness: There is generalized abdominal tenderness.      Comments: Exquisite voluntary guarding with even light palpation of the abdomen.    Musculoskeletal:         General: No tenderness. Normal range of motion.      Cervical back: Normal range of motion and neck supple.   Skin:     General: Skin is warm and dry.      Findings: No rash.   Neurological:      Mental Status: She is alert and oriented to person, place, and time.                 ED Course  Labs Reviewed   CBC WITH DIFFERENTIAL WITH PLATELET - Abnormal; Notable for the following components:       Result Value    .0 (*)     MCH 34.2 (*)     RDW-SD 49.0 (*)     .0 (*)     Neutrophil Absolute Prelim 7.93 (*)     Neutrophil Absolute 7.93 (*)     Lymphocyte Absolute 0.84 (*)     All other components within normal  limits   COMP METABOLIC PANEL (14) - Abnormal; Notable for the following components:    Glucose 105 (*)     BUN 7 (*)     BUN/CREA Ratio 7.0 (*)     AST 45 (*)     A/G Ratio 2.1 (*)     All other components within normal limits   BASIC METABOLIC PANEL (8) - Abnormal; Notable for the following components:    BUN 6 (*)     BUN/CREA Ratio 6.5 (*)     Calcium, Total 8.6 (*)     All other components within normal limits   CBC WITH DIFFERENTIAL WITH PLATELET - Abnormal; Notable for the following components:    RBC 3.72 (*)     .8 (*)     MCH 34.1 (*)     RDW-SD 49.2 (*)     .0 (*)     All other components within normal limits   POCT PREGNANCY URINE - Normal   RAINBOW DRAW BLUE                            MDM         Admission disposition: 6/24/2025  2:20 PM           Medical Decision Making  DDX considered for perforation, mucocytis, post polypectomy peritonitis.    Problems Addressed:  Peritonitis (HCC): acute illness or injury  Status post colon polypectomy: acute illness or injury    Amount and/or Complexity of Data Reviewed  Labs: ordered. Decision-making details documented in ED Course.     Details: Labs normal  Radiology: ordered and independent interpretation performed. Decision-making details documented in ED Course.     Details: CT shows no evidence of perforation  Discussion of management or test interpretation with external provider(s): Seen by GI, recommends starting IV abx for post polypectomy peritonitis and observation admission.    Risk  Prescription drug management.  Parenteral controlled substances.  Decision regarding hospitalization.        Disposition and Plan     Clinical Impression:  1. Status post colon polypectomy    2. Peritonitis (HCC)         Disposition:  Admit  6/24/2025  2:20 pm    Follow-up:  No follow-up provider specified.        Medications Prescribed:  Current Discharge Medication List                Supplementary Documentation:         Hospital Problems       Present on  Admission  Date Reviewed: 4/29/2025          ICD-10-CM Noted POA    * (Principal) Status post colon polypectomy Z98.890, Z86.0100 6/24/2025 Unknown    Abdominal pain R10.9 6/24/2025 Unknown    Hyperglycemia R73.9 6/24/2025 Yes    Peritonitis (HCC) K65.9 6/24/2025 Unknown    Thrombocytopenia (HCC) D69.6 6/24/2025 Yes

## 2025-06-25 NOTE — DISCHARGE SUMMARY
[facility]  part of [facility]    Discharge Summary    Eliana Jean Patient Status:  Observation    1975 MRN [id number]   Location [facility] 4W/SW/SE Attending Baljit Gandhi MD   Hosp Day # 0 PCP Sully John MD     Date of Admission: 2025     Date of Discharge: 25      Lace+ Score: 29  59-90 High Risk  29-58 Medium Risk  0-28   Low Risk.    TCM Follow-Up Recommendation:  LACE 29-58: Moderate Risk of readmission after discharge from the hospital.    DISCHARGE DX: Principal Problem:    Status post colon polypectomy  Active Problems:    Thrombocytopenia (HCC)    Hyperglycemia    Peritonitis (HCC)    Abdominal pain       The patient was seen and examined on day of discharge and this discharge summary is in conjunction with any daily progress note from day of discharge.    HPI per admitting physician: \"Patient is a 49-year-old  female who had a colonoscopy and polypectomy yesterday. Toward the evening, started feeling generalized abdominal pain that progressed until this morning. She came into the emergency department for evaluation. CBC showed white blood cell count of 9.3 with left shift, hemoglobin 14.6, . Chemistry and liver function tests were unremarkable. CT scan of the abdomen showed fluid in the cecum, nonspecific, and may reflect mild enteritis; caliber change in the distal transverse colon which is nonspecific, may reflect partial obstruction versus focal ileus; no evidence of perforation or free peritoneal air. Patient as started empirically on IV antibiotics, and she will be observed overnight. \"    Hospital Course:        #ABD Pain  #Post Polypectomy via EMR  -No fevers or leukocytosis.    -CT abdomen pelvis shows no significant wall thickening or perforation.  There is nonspecific fluid in the right colon and caliber change at the distal transverse colon, possible focal ileus.   -Empiric ABX initiated for possible early  post polypectomy syndrome given recent cautery polypectomy and presence of ABD pain  -Pain improving   -Tolerating advancing diet. Advance to low fiber/soft diet  -GI consulted, Cleared for dc, recommend completing course of empiric abx as outpt. Follow up in clinic    Physical Exam:    Vitals:    06/24/25 1904 06/24/25 1906 06/25/25 0512 06/25/25 0756   BP: 99/66  101/61 104/64   BP Location: Right arm  Right arm Right arm   Pulse: 56  73 57   Resp: 18  17 17   Temp:   98.2 °F (36.8 °C) 98.3 °F (36.8 °C)   TempSrc:   Oral Oral   SpO2: 100%  97% 96%   Weight: 141 lb 12.8 oz (64.3 kg) 141 lb 12.8 oz (64.3 kg)       Patient Weight for the past 72 hrs:   Weight   06/24/25 1021 140 lb (63.5 kg)   06/24/25 1904 141 lb 12.8 oz (64.3 kg)   06/24/25 1906 141 lb 12.8 oz (64.3 kg)       Intake/Output Summary (Last 24 hours) at 6/25/2025 1413  Last data filed at 6/24/2025 1415  Gross per 24 hour   Intake 1000 ml   Output --   Net 1000 ml         GENERAL:  Awake and alert, in no acute distress.  HEART:  S1 and S2 heard.  RRR   LUNGS:  Air entry was good.  No crackles or wheezes   ABDOMEN: Soft and minimally-tender.    PSYCHIATRIC: Normal mood    CULTURE:   No results found for this visit on 06/24/25.    IMAGING STUDIES: SOME MAY NEED FOLLOW UP WITH PCP   CT ABDOMEN+PELVIS(CONTRAST ONLY)(CPT=74177)  Result Date: 6/24/2025  CONCLUSION: Fluid in the cecum is nonspecific and may reflect a mild enteritis. Caliber change at the distal transverse colon is nonspecific and may reflect partial obstruction or focal ileus, but may simply be secondary to peristalsis. No wall thickening. No evidence of perforation. The appendix is not seen but there is no pericecal inflammatory changes. Postbiopsy changes are noted with clips seen at the splenic flexure. Additional chronic and/or incidental findings are detailed in the body of this report. Electronically Verified and Signed by Attending Radiologist: Orville Ellis MD 6/24/2025 2:13 PM  Workstation: [id number]      LABS :     Lab Results   Component Value Date    WBC 5.5 06/25/2025    HGB 12.7 06/25/2025    HCT 38.6 06/25/2025    .0 (L) 06/25/2025    CREATSERUM 0.92 06/25/2025    BUN 6 (L) 06/25/2025     06/25/2025    K 3.9 06/25/2025     06/25/2025    CO2 28.0 06/25/2025    GLU 89 06/25/2025    CA 8.6 (L) 06/25/2025    ALB 4.4 06/24/2025    ALKPHO 65 06/24/2025    BILT 0.7 06/24/2025    TP 6.5 06/24/2025    AST 45 (H) 06/24/2025    ALT 44 06/24/2025    TSH 2.676 02/15/2024    PHOS 3.5 10/03/2017    B12 703 12/12/2019       Recent Labs   Lab 06/24/25  1205 06/25/25  0500   RBC 4.27 3.72*   HGB 14.6 12.7   HCT 44.0 38.6   .0* 103.8*   MCH 34.2* 34.1*   MCHC 33.2 32.9   RDW 12.9 12.8   NEPRELIM 7.93* 3.80   WBC 9.3 5.5   .0* 131.0*     Recent Labs   Lab 06/24/25  1205 06/25/25  0500   * 89   BUN 7* 6*   CREATSERUM 1.00 0.92   CA 9.0 8.6*   ALB 4.4  --     140   K 3.9 3.9    108   CO2 30.0 28.0   ALKPHO 65  --    AST 45*  --    ALT 44  --    BILT 0.7  --    TP 6.5  --      No results found for: \"PT\", \"INR\"    Disposition: Discharge to Home    Condition at Discharge: Stable     Discharge Medications:      Discharge Medications        START taking these medications        Instructions Prescription details   levoFLOXacin 750 MG Tabs  Commonly known as: Levaquin      Take 1 tablet (750 mg total) by mouth daily for 3 days.   Stop taking on: June 28, 2025  Quantity: 3 tablet  Refills: 0     metroNIDAZOLE 500 MG Tabs  Commonly known as: Flagyl      Take 1 tablet (500 mg total) by mouth 3 (three) times daily for 3 days.   Stop taking on: June 28, 2025  Quantity: 9 tablet  Refills: 0            CONTINUE taking these medications        Instructions Prescription details   B COMPLEX 1 OR      Take 1 tablet by mouth in the morning.   Refills: 0     B-12 100 MCG Tabs      Take 1 tablet by mouth in the morning.   Refills: 0     budesonide 3 MG Cpep  Commonly known  as: Entocort EC      Take 3 capsules (9 mg total) by mouth every morning. Take 3 capsules (9mg) every day in the morning for up to 8 weeks   Quantity: 100 capsule  Refills: 5     clonazePAM 1 MG Tabs  Commonly known as: KlonoPIN      Take 0.5 mg by mouth nightly.   Refills: 0     Melatonin 1 MG Caps      Take 5 capsules (5 mg total) by mouth at bedtime.   Refills: 0     ondansetron 4 MG Tbdp  Commonly known as: Zofran-ODT      Take 1 tablet (4 mg total) by mouth every 8 (eight) hours as needed for Nausea.   Quantity: 10 tablet  Refills: 0     sertraline 100 MG Tabs  Commonly known as: Zoloft      Take 1 tablet (100 mg total) by mouth in the morning.   Refills: 0     Vitamin D 50 MCG (2000 UT) Caps      Take 1 capsule (2,000 Units total) by mouth daily.   Refills: 0            STOP taking these medications      famotidine 40 MG Tabs  Commonly known as: Pepcid        PEG-KCl-NaCl-NaSulf-Na Asc-C 100 g Solr  Commonly known as: MoviPrep                  Where to Get Your Medications        These medications were sent to [facility]/pharmacy #[id number] - [place], [place] - [address] AT [facility], [phone], [phone]  [address] [facility] [facility] [address]      Hours: 24-hours Phone: [phone]   levoFLOXacin 750 MG Tabs  metroNIDAZOLE 500 MG Tabs         Follow up Visits  No follow-up provider specified.  Sully John MD    Consultants         Provider   Role Specialty     Sully Hutchison MD      Consulting Physician GASTROENTEROLOGY              Other Discharge Instructions:       ----------------------------------------------------  34 MIN SPENT ON THIS DC   Baljit Gandhi MD    6/25/2025

## 2025-06-25 NOTE — PLAN OF CARE
Patient alert and oriented X4. Stand by assist. NPO with sips of clear liquids. IV ABX. Morphine for PRN pain control. Call light within reach. Bed locked in lowest position.   Problem: Patient Centered Care  Goal: Patient preferences are identified and integrated in the patient's plan of care  Description: Interventions:  - What would you like us to know as we care for you?   - Provide timely, complete, and accurate information to patient/family  - Incorporate patient and family knowledge, values, beliefs, and cultural backgrounds into the planning and delivery of care  - Encourage patient/family to participate in care and decision-making at the level they choose  - Honor patient and family perspectives and choices  Outcome: Progressing     Problem: Patient/Family Goals  Goal: Patient/Family Long Term Goal  Description: Patient's Long Term Goal:     Interventions:  -   - See additional Care Plan goals for specific interventions  Outcome: Progressing  Goal: Patient/Family Short Term Goal  Description: Patient's Short Term Goal:     Interventions:   -   - See additional Care Plan goals for specific interventions  Outcome: Progressing

## 2025-06-25 NOTE — TELEPHONE ENCOUNTER
Maria Alejandra Sharp,    Pt feeling better after eating.  She is going home today with 3 more days of ABX.  Follow up per your recommendations.    Thanks,  Naima

## 2025-06-25 NOTE — PLAN OF CARE
Patient is A/Ox4. On RA. Tolerating LF/S diet. Voiding freely. Up independently. PRN tylenol given for pain. IVF infusing per order. Call light and personal items within reach. Discharge paperwork discussed with patient. All questions answered, no further needs at this time. All personal items transported with patient.      Problem: Patient Centered Care  Goal: Patient preferences are identified and integrated in the patient's plan of care  Description: Interventions:  - What would you like us to know as we care for you? From home with family  - Provide timely, complete, and accurate information to patient/family  - Incorporate patient and family knowledge, values, beliefs, and cultural backgrounds into the planning and delivery of care  - Encourage patient/family to participate in care and decision-making at the level they choose  - Honor patient and family perspectives and choices  Outcome: Adequate for Discharge     Problem: Patient/Family Goals  Goal: Patient/Family Long Term Goal  Description: Patient's Long Term Goal: return to baseline bowel function    Interventions:  - See additional Care Plan goals for specific interventions  Outcome: Adequate for Discharge  Goal: Patient/Family Short Term Goal  Description: Patient's Short Term Goal: pain control    Interventions:   - See additional Care Plan goals for specific interventions  Outcome: Adequate for Discharge     Problem: PAIN - ADULT  Goal: Verbalizes/displays adequate comfort level or patient's stated pain goal  Description: INTERVENTIONS:  - Encourage pt to monitor pain and request assistance  - Assess pain using appropriate pain scale  - Administer analgesics based on type and severity of pain and evaluate response  - Implement non-pharmacological measures as appropriate and evaluate response  - Consider cultural and social influences on pain and pain management  - Manage/alleviate anxiety  - Utilize distraction and/or relaxation techniques  - Monitor  for opioid side effects  - Notify MD/LIP if interventions unsuccessful or patient reports new pain  - Anticipate increased pain with activity and pre-medicate as appropriate  Outcome: Adequate for Discharge     Problem: GASTROINTESTINAL - ADULT  Goal: Minimal or absence of nausea and vomiting  Description: INTERVENTIONS:  - Maintain adequate hydration with IV or PO as ordered and tolerated  - Nasogastric tube to low intermittent suction as ordered  - Evaluate effectiveness of ordered antiemetic medications  - Provide nonpharmacologic comfort measures as appropriate  - Advance diet as tolerated, if ordered  - Obtain nutritional consult as needed  - Evaluate fluid balance  Outcome: Adequate for Discharge  Goal: Maintains or returns to baseline bowel function  Description: INTERVENTIONS:  - Assess bowel function  - Maintain adequate hydration with IV or PO as ordered and tolerated  - Evaluate effectiveness of GI medications  - Encourage mobilization and activity  - Obtain nutritional consult as needed  - Establish a toileting routine/schedule  - Consider collaborating with pharmacy to review patient's medication profile  Outcome: Adequate for Discharge

## 2025-06-25 NOTE — PROGRESS NOTES
[facility]     Gastroenterology Progress Note    Eliana Jean Patient Status:  Observation    1975 MRN [id number]   Location [facility] 4W/SW/SE Attending Baljit Gandhi MD   Hosp Day # 0 PCP Sully John MD       Subjective:   Pt with lower ABD pain, somewhat improved from yesterday, worse with movement 5-10 at rest, 7/10 with movement  Tolerating clears without N/V.  No BM today but passing gas  No fever, chills  No chest pain, SOB  Objective:   Blood pressure 104/64, pulse 57, temperature 98.3 °F (36.8 °C), temperature source Oral, resp. rate 17, weight 141 lb 12.8 oz (64.3 kg), last menstrual period 2025, SpO2 96%. Body mass index is 23.6 kg/m².    Gen: awake, alert patient, NAD  HEENT: EOMI, the sclera appears anicteric, oropharynx clear, mucus membranes appear moist  CV: RRR  Lung: no conversational dyspnea   Abdomen: soft, mild TTP in R/L LQ, ND abdomen with NABS appreciated   Skin: dry, warm, no jaundice  Ext: no LE edema is evident  Neuro: Alert, oriented x3 and interactive  Psych: calm, cooperative    Assessment and Plan:   Eliana Jean is a a(n) 49 year old female w/ a history of microscopic colitis, who presents with abdominal pain post colonoscopy with EMR.      #ABD Pain  #Post Polypectomy via EMR  -No fevers or leukocytosis.    -CT abdomen pelvis shows no significant wall thickening or perforation.  There is nonspecific fluid in the right colon and caliber change at the distal transverse colon, possible focal ileus.   -Empiric ABX initiated for possible early post polypectomy syndrome given recent cautery polypectomy and presence of ABD pain  -Pain somewhat improved since yesterday but persists.  She would like to attempt advancing diet.  Discussed her pain is expected and will take time to resolved but if worse with eating or no significant improvement today would advise another day of IV ABX and monitoring.  Pt states understanding  and agrees to plan.     Recommend:  - empiric IV abx, will need to complete 5 day course  - advance to low fiber/soft diet  - pain control per primary    Case discussed with Sully Hutchison MD, Marco Sharp MD and Mili GREGORY.    Naima Isabel [facility], [facility] Gastroenterology  6/25/2025    ADDENDUM:  Pt feeling well after eating low fiber/soft diet for lunch.  No exacerbation in pain, nausea or vomiting.  No fevers.  She should complete 3 more days of ABX therapy upon discharge and follow up in GI clinic per Dr. Sharp's recommendations.  TE sent.    Stable for discharge from GI stance.  Please call with further questions or concerns.      Results:     Lab Results   Component Value Date    WBC 5.5 06/25/2025    HGB 12.7 06/25/2025    HCT 38.6 06/25/2025    .0 (L) 06/25/2025    CREATSERUM 0.92 06/25/2025    BUN 6 (L) 06/25/2025     06/25/2025    K 3.9 06/25/2025     06/25/2025    CO2 28.0 06/25/2025    GLU 89 06/25/2025    CA 8.6 (L) 06/25/2025    ALB 4.4 06/24/2025    ALKPHO 65 06/24/2025    BILT 0.7 06/24/2025    TP 6.5 06/24/2025    AST 45 (H) 06/24/2025    ALT 44 06/24/2025    TSH 2.676 02/15/2024    PHOS 3.5 10/03/2017    B12 703 12/12/2019       CT ABDOMEN+PELVIS(CONTRAST ONLY)(CPT=74177)  Result Date: 6/24/2025  CONCLUSION: Fluid in the cecum is nonspecific and may reflect a mild enteritis. Caliber change at the distal transverse colon is nonspecific and may reflect partial obstruction or focal ileus, but may simply be secondary to peristalsis. No wall thickening. No evidence of perforation. The appendix is not seen but there is no pericecal inflammatory changes. Postbiopsy changes are noted with clips seen at the splenic flexure. Additional chronic and/or incidental findings are detailed in the body of this report. Electronically Verified and Signed by Attending Radiologist: Orville Ellis MD 6/24/2025 2:13 PM Workstation: [id number]

## 2025-06-25 NOTE — DISCHARGE INSTRUCTIONS
Dr. Sharp's nurses will reach out to you to schedule a follow up appointment  Continue advancing your diet as tolerated  Take your antibiotics as directed  Your last dose of Tylenol was given at 11:30AM (500mg)

## 2025-06-27 ENCOUNTER — APPOINTMENT (OUTPATIENT)
Dept: OBGYN | Age: 50
End: 2025-06-27

## 2025-07-01 NOTE — TELEPHONE ENCOUNTER
I called and spoke with the patient. Says she is feeling better from the procedure and last week. Reviewed and reviewed again the likely post polypectomy syndrome she had which also would not necessarily expect in the future as many polyps can be removed in the future without cautery. Discussed recommendation to have her set up with our PA in the next 2-3 weeks from this hospitalization and she also notes need to discuss microscopic colitis and budesonide. She is agreeable to seeing Perla in the next 2-3 weeks. She was appreciative of the call.    GI staff:    Please call the patient to set up office visit with Perla in the next 2-3 weeks time frame.    Thanks    MD Earnest [facility] - Gastroenterology  7/1/2025  1:55 PM

## 2025-07-02 NOTE — TELEPHONE ENCOUNTER
Called and spoke to the patient, date of birth and name verified.    MD message relayed.    Your Appointments      Friday July 25, 2025 1:00 PM  Follow Up Visit with Perla Rice PA-C  [facility] ([facility]) [facility] [address] [address]  [phone]

## 2025-07-24 NOTE — PROGRESS NOTES
[facility] - Gastroenterology                                                                                                               Reason for consult:   Chief Complaint   Patient presents with    Follow - Up     ER       Requesting physician or provider: Sully John MD      HPI:   Eliana Jean is a 49 year old year-old female with history of microscopic colitis, who presents for hospital follow up.    Patient underwent colonoscopy with endoscopic mucosal resection of 2 large polyps at the distal ascending colon/hepatic flexure. She developed pain afterward. CT abdomen pelvis shows no significant wall thickening or perforation.  There is nonspecific fluid in the right colon and caliber change at the distal transverse colon, possible focal ileus. Had improvement in pain with IV fluids, ABX. Here now for follow up.     Abdominal pain- Has resolved. Feeling well. Tolerating diet.     Diarrhea/diagnosed microscopic colitis- Cln in April biopsies noted microscopic colitis. Was placed on budesonide taper. She noted the beginning of constipation with BM every other day often having to strain with each BM. No nausea or vomiting. Has 3 days left of 3 mg dose of budesonide. She notes yesterday had recurrence of loose stool and fecal urgency. Today has not had a BM. Stool is brown, no bright red blood or dark black stool.    Prior endoscopies:  6/23/2025 Cln  Impression:  1. Two sessile serrated polyps at the hepatic flexure measuring 2.5 cm each and both removed     Recommend:  1. Await pathology.   2. Repeat colonoscopy likely in 6 months pending final pathology results. If new signs or symptoms develop, procedure may need to be repeated sooner.   3. Continue your current medications  4. Follow up with your primary care physician on a routine basis  Final Diagnosis:      Hepatic flexure polyp x2; polypectomy:  Multiple fragments of sessile serrated adenoma.      3/19/2025 EGD/cln  Impression:  Mild gastritis with scattered antral erosions.  Otherwise unremarkable EGD s/p biopsies of the esophagus, stomach, and duodenum.  One large ascending colon polyp. Not removed as it will require either EMR by interventional GI or surgical resection.    Small internal hemorrhoids.   Otherwise unremarkable ileocolonoscopy s/p biopsies of the ileum and colon.      Recommend:  Await pathology.   Follow up with Dr. Hinkle in 1-2 weeks to discuss next steps.    High fiber diet.  Monitor for blood in the stool. If having more than just tinge of blood, call office or go to the ER.    Wt Readings from Last 6 Encounters:   07/25/25 136 lb (61.7 kg)   06/24/25 141 lb 12.8 oz (64.3 kg)   06/23/25 140 lb (63.5 kg)   04/29/25 138 lb (62.6 kg)   03/12/25 140 lb (63.5 kg)   03/04/25 140 lb (63.5 kg)        History, Medications, Allergies, ROS:      Past Medical History[1]   Past Surgical History[2]   Family Hx: Family History[3]   Social History: Short Social Hx on File[4]     Medications (Active prior to today's visit):  Current Medications[5]    Allergies:  Allergies[6]    ROS:   CONSTITUTIONAL: negative for fevers, chills, sweats and weight loss  EYES Negative for red eyes, yellow eyes, changes in vision  HEENT: Negative for dysphagia and hoarseness  RESPIRATORY: Negative for cough and shortness of breath  CARDIOVASCULAR: Negative for chest pain, palpitations  GASTROINTESTINAL: See HPI  GENITOURINARY: Negative for dysuria and frequency  MUSCULOSKELETAL: Negative for arthralgias and myalgias  NEUROLOGICAL: Negative for dizziness and headaches  BEHAVIOR/PSYCH: Negative for anxiety and poor appetite    PHYSICAL EXAM:   Blood pressure 99/66, pulse 69, height 5' 5\" (1.651 m), weight 136 lb (61.7 kg), last menstrual period 06/13/2025.    GEN: WD/WN, NAD  HEENT: Supple symmetrical, trachea midline  CV: RRR, the extremities are warm and well perfused   LUNGS: No increased work of breathing  ABDOMEN: No  scars, normal bowel sounds, soft, non-tender, non-distended no rebound or guarding, no masses, no hepatomegaly  MSK: No redness, no warmth, no swelling of joints  SKIN: No jaundice, no erythema, no rashes  HEMATOLOGIC: No bleeding, no bruising  NEURO: Alert and interactive, normal gait    Labs/Imaging/Procedures:     Patient's pertinent labs and imaging were reviewed and discussed with patient today.     Lab Results   Component Value Date    WBC 5.5 06/25/2025    RBC 3.72 (L) 06/25/2025    HGB 12.7 06/25/2025    HCT 38.6 06/25/2025    .8 (H) 06/25/2025    MCH 34.1 (H) 06/25/2025    MCHC 32.9 06/25/2025    RDW 12.8 06/25/2025    .0 (L) 06/25/2025    MPV 10.8 (H) 09/29/2017        Lab Results   Component Value Date    GLU 89 06/25/2025    BUN 6 (L) 06/25/2025    BUNCREA 6.5 (L) 06/25/2025    CREATSERUM 0.92 06/25/2025    ANIONGAP 4 06/25/2025    GFRNAA 70 03/11/2022    GFRAA 81 03/11/2022    CA 8.6 (L) 06/25/2025    OSMOCALC 287 06/25/2025    ALKPHO 65 06/24/2025    AST 45 (H) 06/24/2025    ALT 44 06/24/2025    BILT 0.7 06/24/2025    TP 6.5 06/24/2025    ALB 4.4 06/24/2025    GLOBULIN 2.1 06/24/2025     06/25/2025    K 3.9 06/25/2025     06/25/2025    CO2 28.0 06/25/2025        No results found.            .  ASSESSMENT/PLAN:   Eliana Jean is a 49 year old year-old female with history of microscopic colitis, who presents for hospital follow up.    Patient underwent colonoscopy with endoscopic mucosal resection of 2 large polyps at the distal ascending colon/hepatic flexure. She developed pain afterward. CT abdomen pelvis shows no significant wall thickening or perforation.  There is nonspecific fluid in the right colon and caliber change at the distal transverse colon, possible focal ileus. Had improvement in pain with IV fluids, ABX. Here now for follow up.     #post polypectomy syndrome  -now no abdominal pain, tolerating diet  -discussed plan for repeat colonoscopy in 6  months    #microscopic colitis  -prior to EGD/cln in April had complaints of diarrhea  -biopsies noted microscopic colitis   -has 3 days left of budesonide taper  -has been having formed stools often every other day, did have episode of fecal urgency  -advised to see how stools are off budesonide and if diarrhea returns should complete KUB to evaluate for stool burden for concern for overflow vs recurrent microscopic colitis  -patient acknowledged understanding, all questions answered    1. Schedule colonoscopy with Dr. Sharp  in  6 months with MAC [Diagnosis: hx of colon polyps]    2.  bowel prep from pharmacy (split trilyte)    3. Continue all medications as normal for your procedure.    4. Read all bowel prep instructions carefully. Bowel prep instructions can also be found online at:  [email or web address].org/giprep     5. AVOID seeds, nuts, popcorn, raw fruits and vegetables for 3 days before procedure    6. You MAY need to go for COVID testing 72 hours before procedure. The testing team will call you a few days before your procedure to discuss with you if testing is required. If you are asked to go for COVID testing and do not completed the test, the procedure cannot be performed.     7. If you start any NEW medication after your visit today, please notify us. Certain medications (like iron or weight loss medications) will need to be held before the procedure, or the procedure cannot be performed safely.          Orders This Visit:  No orders of the defined types were placed in this encounter.      Meds This Visit:  Requested Prescriptions     Signed Prescriptions Disp Refills    polyethylene glycol, PEG 3350-KCl-NaBcb-NaCl-NaSulf, 236 g Oral Recon Soln 1 each 0     Sig: Take 4,000 mL by mouth As Directed. Take 2,000 mL the night before your procedure and 2,000 mL the morning of your procedure.       Imaging & Referrals:  XR ABDOMEN (1 VIEW) (CPT=74018)    ENDOSCOPIC RISK BENEFIT DISCUSSION: I described the  procedure in great detail with the patient. I discussed the risks and benefits, including but not limited to: bleeding, perforation, infection, anesthesia complications, and even death. Patient will be NPO after midnight and will have a person physically present at time of pick-up to drive patient home. Patient verbalized understanding and agrees to proceed with procedure as planned.      Perla Rice PA-C   7/25/2025        This note was partially prepared using Dragon Medical voice recognition dictation software. As a result, errors may occur. When identified, these errors have been corrected. While every attempt is made to correct errors during dictation, discrepancies may still exist.          [1]   Past Medical History:   Anxiety state    Esophageal reflux    History of esophagogastroduodenoscopy (EGD)    Hx of colonoscopy    Optic disc hemorrhage, left    PVCs (premature ventricular contractions)    pt not taking medication    Visual impairment    CONTACTS/GLASSES   [2]   Past Surgical History:  Procedure Laterality Date    Colonoscopy N/A 3/19/2025    Procedure: COLONOSCOPY;  Surgeon: Tonya Hinkle MD;  Location: [facility] ENDOSCOPY    Colonoscopy N/A 6/23/2025    Procedure: COLONOSCOPY with Endoscopic Mucosal Resection;  Surgeon: Marco Sharp MD;  Location: [facility] ENDOSCOPY   [3]   Family History  Problem Relation Age of Onset    Depression Mother     Glaucoma Mother     Other (Other) Mother         Rheumatoid arth    Diabetes Father         type 2    Depression Father     Bleeding Disorders Paternal Grandmother     Heart Disease Maternal Uncle         50s    Macular degeneration Neg     Breast Cancer Neg     Ovarian Cancer Neg     Pancreatic Cancer Neg     Prostate Cancer Neg     Colon Cancer Neg     Uterine Cancer Neg    [4]   Social History  Socioeconomic History    Marital status:    Tobacco Use    Smoking status: Never    Smokeless tobacco: Never   Vaping Use    Vaping status: Never Used    Substance and Sexual Activity    Alcohol use: Yes     Comment: SOCIALLY    Drug use: No    Sexual activity: Yes     Partners: Male     Birth control/protection: Pill     Social Drivers of Health     Food Insecurity: No Food Insecurity (6/24/2025)    NCSS - Food Insecurity     Worried About Running Out of Food in the Last Year: No     Ran Out of Food in the Last Year: No   Transportation Needs: No Transportation Needs (6/24/2025)    NCSS - Transportation     Lack of Transportation: No   Housing Stability: Not At Risk (6/24/2025)    NCSS - Housing/Utilities     Has Housing: Yes     Worried About Losing Housing: No     Unable to Get Utilities: No   [5]   Current Outpatient Medications   Medication Sig Dispense Refill    polyethylene glycol, PEG 3350-KCl-NaBcb-NaCl-NaSulf, 236 g Oral Recon Soln Take 4,000 mL by mouth As Directed. Take 2,000 mL the night before your procedure and 2,000 mL the morning of your procedure. 1 each 0    Cholecalciferol (VITAMIN D) 50 MCG (2000 UT) Oral Cap Take 1 capsule (2,000 Units total) by mouth daily.      clonazePAM 1 MG Oral Tab Take 0.5 mg by mouth nightly.      budesonide 3 MG Oral Cap DR Particles Take 3 capsules (9 mg total) by mouth every morning. Take 3 capsules (9mg) every day in the morning for up to 8 weeks 100 capsule 5    ondansetron 4 MG Oral Tablet Dispersible Take 1 tablet (4 mg total) by mouth every 8 (eight) hours as needed for Nausea. 10 tablet 0    Melatonin 1 MG Oral Cap Take 5 capsules (5 mg total) by mouth at bedtime.      sertraline 100 MG Oral Tab Take 1 tablet (100 mg total) by mouth in the morning.      B Complex Vitamins (B COMPLEX 1 OR) Take 1 tablet by mouth in the morning.      Cyanocobalamin (B-12) 100 MCG Oral Tab Take 1 tablet by mouth in the morning.     [6]   Allergies  Allergen Reactions    No Known Allergies OTHER (SEE COMMENTS)

## 2025-07-25 ENCOUNTER — OFFICE VISIT (OUTPATIENT)
Facility: CLINIC | Age: 50
End: 2025-07-25
Payer: COMMERCIAL

## 2025-07-25 ENCOUNTER — TELEPHONE (OUTPATIENT)
Facility: CLINIC | Age: 50
End: 2025-07-25

## 2025-07-25 VITALS
SYSTOLIC BLOOD PRESSURE: 99 MMHG | BODY MASS INDEX: 22.66 KG/M2 | WEIGHT: 136 LBS | DIASTOLIC BLOOD PRESSURE: 66 MMHG | HEART RATE: 69 BPM | HEIGHT: 65 IN

## 2025-07-25 DIAGNOSIS — R19.7 DIARRHEA, UNSPECIFIED TYPE: ICD-10-CM

## 2025-07-25 DIAGNOSIS — Z86.0100 HX OF COLONIC POLYPS: Primary | ICD-10-CM

## 2025-07-25 DIAGNOSIS — K52.832 LYMPHOCYTIC COLITIS: ICD-10-CM

## 2025-07-25 DIAGNOSIS — D12.2 ADENOMATOUS POLYP OF ASCENDING COLON: Primary | ICD-10-CM

## 2025-07-25 PROCEDURE — 99214 OFFICE O/P EST MOD 30 MIN: CPT | Performed by: PHYSICIAN ASSISTANT

## 2025-07-25 NOTE — PATIENT INSTRUCTIONS
Recommendations:  -if diarrhea recurs get KUB done    1. Schedule colonoscopy with Dr. Sharp  in  6 months with MAC [Diagnosis: hx of colon polyps]    2.  bowel prep from pharmacy (Datacastlelyte)    3. Continue all medications as normal for your procedure.    4. Read all bowel prep instructions carefully. Bowel prep instructions can also be found online at:  www.health.org/giprep     5. AVOID seeds, nuts, popcorn, raw fruits and vegetables for 3 days before procedure    6. You MAY need to go for COVID testing 72 hours before procedure. The testing team will call you a few days before your procedure to discuss with you if testing is required. If you are asked to go for COVID testing and do not completed the test, the procedure cannot be performed.     7. If you start any NEW medication after your visit today, please notify us. Certain medications (like iron or weight loss medications) will need to be held before the procedure, or the procedure cannot be performed safely.

## (undated) DEVICE — GIJAW SINGLE-USE BIOPSY FORCEPS WITH NEEDLE: Brand: GIJAW

## (undated) DEVICE — NEEDLE INJ 25GA CATH 230CM CHN 2.8MM ACUJECT

## (undated) DEVICE — MEDI-VAC NON-CONDUCTIVE SUCTION TUBING 6MM X 1.8M (6FT.) L: Brand: CARDINAL HEALTH

## (undated) DEVICE — Device

## (undated) DEVICE — KIT ENDO ORCAPOD 160/180/190

## (undated) DEVICE — PATIENT RETURN ELECTRODE, SINGLE-USE, CONTACT QUALITY MONITORING, ADULT, WITH 9FT CORD, FOR PATIENTS WEIGING OVER 33LBS. (15KG): Brand: MEGADYNE

## (undated) DEVICE — CONMED SCOPE SAVER BITE BLOCK, 20X27 MM: Brand: SCOPE SAVER

## (undated) DEVICE — V2 SPECIMEN COLLECTION MANIFOLD KIT: Brand: NEPTUNE

## (undated) DEVICE — AGENT SUBMUCOSAL LIFTING 10ML INJ POLYPR

## (undated) DEVICE — KIT CLEAN ENDOKIT 1.1OZ GOWNX2

## (undated) DEVICE — CAP SEALING, REVEAL 13.4MM

## (undated) DEVICE — LASSO POLYPECTOMY SNARE: Brand: LASSO

## (undated) DEVICE — 60 ML SYRINGE REGULAR TIP: Brand: MONOJECT

## (undated) DEVICE — MEDI-VAC NON-CONDUCTIVE SUCTION TUBING: Brand: CARDINAL HEALTH

## (undated) DEVICE — NET SPEC 3X6CM TIP DIA2.5MM CATH L230CM

## (undated) DEVICE — YANKAUER,BULB TIP,W/O VENT,RIGID,STERILE: Brand: MEDLINE

## (undated) NOTE — LETTER
[facility]  [address], [place], [place]    Authorization for Surgical Operation and Procedure                               I hereby authorize Marco Sharp MD, my physician and his/her assistants (if applicable), which may include medical students, residents, and/or fellows, to perform the following surgical operation/ procedure and administer such anesthesia as may be determined necessary by my physician: Operation/Procedure name (s) COLONOSCOPY with Endoscopic Mucosal Resection on Eliana DONTE VictoriaTed   2.   I recognize that during the surgical operation/procedure, unforeseen conditions may necessitate additional or different procedures than those listed above.  I, therefore, further authorize and request that the above-named surgeon, assistants, or designees perform such procedures as are, in their judgment, necessary and desirable.    3.   My surgeon/physician has discussed prior to my surgery the potential benefits, risks and side effects of this procedure; the likelihood of achieving goals; and potential problems that might occur during recuperation.  They also discussed reasonable alternatives to the procedure, including risks, benefits, and side effects related to the alternatives and risks related to not receiving this procedure.  I have had all my questions answered and I acknowledge that no guarantee has been made as to the result that may be obtained.    4.   Should the need arise during my operation/procedure, which includes change of level of care prior to discharge, I also consent to the administration of blood and/or blood products.  Further, I understand that despite careful testing and screening of blood or blood products by collecting agencies, I may still be subject to ill effects as a result of receiving a blood transfusion and/or blood products.  The following are some, but not all, of the potential risks that can occur: fever and allergic reactions, hemolytic  reactions, transmission of diseases such as Hepatitis, AIDS and Cytomegalovirus (CMV) and fluid overload.  In the event that I wish to have an autologous transfusion of my own blood, or a directed donor transfusion, I will discuss this with my physician.  Check only if Refusing Blood or Blood Products  I understand refusal of blood or blood products as deemed necessary by my physician may have serious consequences to my condition to include possible death. I hereby assume responsibility for my refusal and release the hospital, its personnel, and my physicians from any responsibility for the consequences of my refusal.    o  Refuse   5.   I authorize the use of any specimen, organs, tissues, body parts or foreign objects that may be removed from my body during the operation/procedure for diagnosis, research or teaching purposes and their subsequent disposal by hospital authorities.  I also authorize the release of specimen test results and/or written reports to my treating physician on the hospital medical staff or other referring or consulting physicians involved in my care, at the discretion of the Pathologist or my treating physician.    6.   I consent to the photographing or videotaping of the operations or procedures to be performed, including appropriate portions of my body for medical, scientific, or educational purposes, provided my identity is not revealed by the pictures or by descriptive texts accompanying them.  If the procedure has been photographed/videotaped, the surgeon will obtain the original picture, image, videotape or CD.  The hospital will not be responsible for storage, release or maintenance of the picture, image, tape or CD.    7.   I consent to the presence of a  or observers in the operating room as deemed necessary by my physician or their designees.    8.   I recognize that in the event my procedure results in extended X-Ray/fluoroscopy time, I may develop a skin  reaction.    9. If I have a Do Not Attempt Resuscitation (DNAR) order in place, that status will be suspended while in the operating room, procedural suite, and during the recovery period unless otherwise explicitly stated by me (or a person authorized to consent on my behalf). The surgeon or my attending physician will determine when the applicable recovery period ends for purposes of reinstating the DNAR order.  10. Patients having a sterilization procedure: I understand that if the procedure is successful the results will be permanent and it will therefore be impossible for me to inseminate, conceive, or bear children.  I also understand that the procedure is intended to result in sterility, although the result has not been guaranteed.   11. I acknowledge that my physician has explained sedation/analgesia administration to me including the risk and benefits I consent to the administration of sedation/analgesia as may be necessary or desirable in the judgment of my physician.    I CERTIFY THAT I HAVE READ AND FULLY UNDERSTAND THE ABOVE CONSENT TO OPERATION and/or OTHER PROCEDURE.     ____________________________________  _________________________________        ______________________________  Signature of Patient    Signature of Responsible Person                Printed Name of Responsible Person                                      ____________________________________  _____________________________                ________________________________  Signature of Witness        Date  Time         Relationship to Patient    STATEMENT OF PHYSICIAN My signature below affirms that prior to the time of the procedure; I have explained to the patient and/or his/her legal representative, the risks and benefits involved in the proposed treatment and any reasonable alternative to the proposed treatment. I have also explained the risks and benefits involved in refusal of the proposed treatment and alternatives to the proposed  treatment and have answered the patient's questions. If I have a significant financial interest in a co-management agreement or a significant financial interest in any product or implant, or other significant relationship used in this procedure/surgery, I have disclosed this and had a discussion with my patient.     _____________________________________________________              _____________________________  (Signature of Physician)                                                                                         (Date)                                   (Time)  Patient Name: Eliana DONTE Jean      : 1975      Printed: 2025     Medical Record #: [id number]                                      Page 1 of 1

## (undated) NOTE — Clinical Note
Hi! I saw this patient in clinic today. She is doing great. I know she needs repeat colonoscopy, I originally scheduled it with you but now looking at it not sure if you wanted me to send it to Dr. Hinkle. Just let me know and I will get her switched.  Thanks! Perla

## (undated) NOTE — LETTER
[address], [facility]   Date:   9/13/2022     Name:   Dakota Bronson    YOB: 1975   MRN:   [id number]       WHERE IS YOUR PAIN NOW? Ricci the areas on your body where you feel the described sensations. Use the appropriate symbol. Larance Saltness the areas of radiation. Include all affected areas. Just to complete the picture, please draw in the face. ACHE:  ^ ^ ^   NUMBNESS:  0000   PINS & NEEDLES:  = = = =                              ^ ^ ^                       0000              = = = =                                    ^ ^ ^                       0000            = = = =      BURNING:  XXXX   STABBING: ////                  XXXX                ////                         XXXX          ////     Please ricci the line below indicating your degree of pain right now  with 0 being no pain 10 being the worst pain possible.                                          0             1             2              3             4              5              6              7             8             9             10         Patient Signature:

## (undated) NOTE — LETTER
8/17/2017              Naseemia Pouch        [address]        [address] [address]         Dear Sacha Garcia,    It was a pleasure to see you. Your mammogram was normal.  There is no need for further testing at this time.   I look forward to seeing y

## (undated) NOTE — LETTER
AUTHORIZATION FOR SURGICAL OPERATION OR OTHER PROCEDURE    1.  I hereby authorize Dr. Mona Koyanagi, [facility] assigned to my case to perform the following operation and/or procedure at the [facility]:    acupuncture  _______________ Time:  ________ A. M.  P.M.        Patient Name:  ______________________________________________________  (please print)      Patient signature:  ___________________________________________________             Relationship to Patient:

## (undated) NOTE — LETTER
[facility]  Administration of Anesthesia  I, Eliana Jean agree to be cared for by a physician anesthesiologist alone and/or with a nurse anesthetist, who is specially trained to monitor me and give me medicine to put me to sleep or keep me comfortable during my procedure    I understand that my anesthesiologist and/or anesthetist is not an employee or agent of [facility] or [employer]. He or she works for [facility]    As the patient asking for anesthesia services, I agree to:  Allow the anesthesiologist (anesthesia doctor) to give me medicine and do additional procedures as necessary. Some examples are: Starting or using an “IV” to give me medicine, fluids or blood during my procedure, and having a breathing tube placed to help me breathe when I’m asleep (intubation). In the event that my heart stops working properly, I understand that my anesthesiologist will make every effort to sustain my life, unless otherwise directed by [facility] Do Not Resuscitate documents.  Tell my anesthesia doctor before my procedure:  If I am pregnant.  The last time that I ate or drank.  iii. All of the medicines I take (including prescriptions, herbal supplements, and pills I can buy without a prescription (including street drugs/illegal medications). Failure to inform my anesthesiologist about these medicines may increase my risk of anesthetic complications.  iv.If I am allergic to anything or have had a reaction to anesthesia before.  I understand how the anesthesia medicine will help me (benefits).  I understand that with any type of anesthesia medicine there are risks:  The most common risks are: nausea, vomiting, sore throat, muscle soreness, damage to my eyes, mouth, or teeth (from breathing tube placement).  Rare risks include: remembering what happened during my procedure, allergic reactions to medications, injury to my airway, heart, lungs, vision, nerves, or  muscles and in extremely rare instances death.  My doctor has explained to me other choices available to me for my care (alternatives).  Pregnant Patients (“epidural”):  I understand that the risks of having an epidural (medicine given into my back to help control pain during labor), include itching, low blood pressure, difficulty urinating, headache or slowing of the baby’s heart. Very rare risks include infection, bleeding, seizure, irregular heart rhythms and nerve injury.  Regional Anesthesia (“spinal”, “epidural”, & “nerve blocks”):  I understand that rare but potential complications include headache, bleeding, infection, seizure, irregular heart rhythms, and nerve injury.    _____________________________________________________________________________  Patient (or Representative) Signature/Relationship to Patient  Date   Time    _____________________________________________________________________________   Name (if used)    Language/Organization   Time    _____________________________________________________________________________  Nurse Anesthetist Signature     Date   Time  _____________________________________________________________________________  Anesthesiologist Signature     Date   Time  I have discussed the procedure and information above with the patient (or patient’s representative) and answered their questions. The patient or their representative has agreed to have anesthesia services.    _____________________________________________________________________________  Witness        Date   Time  I have verified that the signature is that of the patient or patient’s representative, and that it was signed before the procedure  Patient Name: Eliana Jean     : 1975                 Printed: 3/17/2025 at 4:23 PM    Medical Record #: [id number]                                            Page 1 of 1  ----------ANESTHESIA CONSENT----------

## (undated) NOTE — LETTER
AUTHORIZATION FOR SURGICAL OPERATION OR OTHER PROCEDURE    1.  I hereby authorize Dr. Alex Blanca, and [facility] staff assigned to my case to perform the following operation and/or procedure at the [facility]:    acupuncture  _______________ P.M.       Patient Name:  ______________________________________________________  (please print)      Patient signature:  ___________________________________________________             Relationship to Patient:           []  Parent    Responsible person

## (undated) NOTE — LETTER
[facility]  [address], [place], [place]    Authorization for Surgical Operation and Procedure                               I hereby authorize Tonya Hinkle MD, my physician and his/her assistants (if applicable), which may include medical students, residents, and/or fellows, to perform the following surgical operation/ procedure and administer such anesthesia as may be determined necessary by my physician: Operation/Procedure name (s) COLONOSCOPY/ ESOPHAGOGASTRODUODENOSCOPY [facility]   2.   I recognize that during the surgical operation/procedure, unforeseen conditions may necessitate additional or different procedures than those listed above.  I, therefore, further authorize and request that the above-named surgeon, assistants, or designees perform such procedures as are, in their judgment, necessary and desirable.    3.   My surgeon/physician has discussed prior to my surgery the potential benefits, risks and side effects of this procedure; the likelihood of achieving goals; and potential problems that might occur during recuperation.  They also discussed reasonable alternatives to the procedure, including risks, benefits, and side effects related to the alternatives and risks related to not receiving this procedure.  I have had all my questions answered and I acknowledge that no guarantee has been made as to the result that may be obtained.    4.   Should the need arise during my operation/procedure, which includes change of level of care prior to discharge, I also consent to the administration of blood and/or blood products.  Further, I understand that despite careful testing and screening of blood or blood products by collecting agencies, I may still be subject to ill effects as a result of receiving a blood transfusion and/or blood products.  The following are some, but not all, of the potential risks that can occur: fever and allergic reactions, hemolytic reactions,  transmission of diseases such as Hepatitis, AIDS and Cytomegalovirus (CMV) and fluid overload.  In the event that I wish to have an autologous transfusion of my own blood, or a directed donor transfusion, I will discuss this with my physician.  Check only if Refusing Blood or Blood Products  I understand refusal of blood or blood products as deemed necessary by my physician may have serious consequences to my condition to include possible death. I hereby assume responsibility for my refusal and release the hospital, its personnel, and my physicians from any responsibility for the consequences of my refusal.    o  Refuse   5.   I authorize the use of any specimen, organs, tissues, body parts or foreign objects that may be removed from my body during the operation/procedure for diagnosis, research or teaching purposes and their subsequent disposal by hospital authorities.  I also authorize the release of specimen test results and/or written reports to my treating physician on the hospital medical staff or other referring or consulting physicians involved in my care, at the discretion of the Pathologist or my treating physician.    6.   I consent to the photographing or videotaping of the operations or procedures to be performed, including appropriate portions of my body for medical, scientific, or educational purposes, provided my identity is not revealed by the pictures or by descriptive texts accompanying them.  If the procedure has been photographed/videotaped, the surgeon will obtain the original picture, image, videotape or CD.  The hospital will not be responsible for storage, release or maintenance of the picture, image, tape or CD.    7.   I consent to the presence of a  or observers in the operating room as deemed necessary by my physician or their designees.    8.   I recognize that in the event my procedure results in extended X-Ray/fluoroscopy time, I may develop a skin reaction.    9. If  I have a Do Not Attempt Resuscitation (DNAR) order in place, that status will be suspended while in the operating room, procedural suite, and during the recovery period unless otherwise explicitly stated by me (or a person authorized to consent on my behalf). The surgeon or my attending physician will determine when the applicable recovery period ends for purposes of reinstating the DNAR order.  10. Patients having a sterilization procedure: I understand that if the procedure is successful the results will be permanent and it will therefore be impossible for me to inseminate, conceive, or bear children.  I also understand that the procedure is intended to result in sterility, although the result has not been guaranteed.   11. I acknowledge that my physician has explained sedation/analgesia administration to me including the risk and benefits I consent to the administration of sedation/analgesia as may be necessary or desirable in the judgment of my physician.    I CERTIFY THAT I HAVE READ AND FULLY UNDERSTAND THE ABOVE CONSENT TO OPERATION and/or OTHER PROCEDURE.     ____________________________________  _________________________________        ______________________________  Signature of Patient    Signature of Responsible Person                Printed Name of Responsible Person                                      ____________________________________  _____________________________                ________________________________  Signature of Witness        Date  Time         Relationship to Patient    STATEMENT OF PHYSICIAN My signature below affirms that prior to the time of the procedure; I have explained to the patient and/or his/her legal representative, the risks and benefits involved in the proposed treatment and any reasonable alternative to the proposed treatment. I have also explained the risks and benefits involved in refusal of the proposed treatment and alternatives to the proposed treatment and have  answered the patient's questions. If I have a significant financial interest in a co-management agreement or a significant financial interest in any product or implant, or other significant relationship used in this procedure/surgery, I have disclosed this and had a discussion with my patient.     _____________________________________________________              _____________________________  (Signature of Physician)                                                                                         (Date)                                   (Time)  Patient Name: Eliana KENT Ted      : 1975      Printed: 3/17/2025     Medical Record #: [id number]                                      Page 1 of 1